# Patient Record
Sex: FEMALE | Race: WHITE | NOT HISPANIC OR LATINO | URBAN - METROPOLITAN AREA
[De-identification: names, ages, dates, MRNs, and addresses within clinical notes are randomized per-mention and may not be internally consistent; named-entity substitution may affect disease eponyms.]

---

## 2017-02-13 ENCOUNTER — OUTPATIENT (OUTPATIENT)
Dept: OUTPATIENT SERVICES | Facility: HOSPITAL | Age: 77
LOS: 1 days | End: 2017-02-13
Payer: MEDICARE

## 2017-02-13 VITALS
RESPIRATION RATE: 16 BRPM | HEIGHT: 66 IN | OXYGEN SATURATION: 98 % | WEIGHT: 192.9 LBS | SYSTOLIC BLOOD PRESSURE: 116 MMHG | DIASTOLIC BLOOD PRESSURE: 77 MMHG | TEMPERATURE: 98 F | HEART RATE: 68 BPM

## 2017-02-13 DIAGNOSIS — Z78.9 OTHER SPECIFIED HEALTH STATUS: ICD-10-CM

## 2017-02-13 DIAGNOSIS — Z98.891 HISTORY OF UTERINE SCAR FROM PREVIOUS SURGERY: Chronic | ICD-10-CM

## 2017-02-13 DIAGNOSIS — Z98.1 ARTHRODESIS STATUS: Chronic | ICD-10-CM

## 2017-02-13 DIAGNOSIS — K21.9 GASTRO-ESOPHAGEAL REFLUX DISEASE WITHOUT ESOPHAGITIS: ICD-10-CM

## 2017-02-13 DIAGNOSIS — G47.33 OBSTRUCTIVE SLEEP APNEA (ADULT) (PEDIATRIC): ICD-10-CM

## 2017-02-13 DIAGNOSIS — R13.10 DYSPHAGIA, UNSPECIFIED: ICD-10-CM

## 2017-02-13 DIAGNOSIS — Z96.659 PRESENCE OF UNSPECIFIED ARTIFICIAL KNEE JOINT: Chronic | ICD-10-CM

## 2017-02-13 DIAGNOSIS — Z96.641 PRESENCE OF RIGHT ARTIFICIAL HIP JOINT: Chronic | ICD-10-CM

## 2017-02-13 DIAGNOSIS — E11.9 TYPE 2 DIABETES MELLITUS WITHOUT COMPLICATIONS: ICD-10-CM

## 2017-02-13 LAB
HBA1C BLD-MCNC: 6.5 % — HIGH (ref 4–5.6)
HCT VFR BLD CALC: 36.1 % — SIGNIFICANT CHANGE UP (ref 34.5–45)
HGB BLD-MCNC: 11.8 G/DL — SIGNIFICANT CHANGE UP (ref 11.5–15.5)
MCHC RBC-ENTMCNC: 27.4 PG — SIGNIFICANT CHANGE UP (ref 27–34)
MCHC RBC-ENTMCNC: 32.7 GM/DL — SIGNIFICANT CHANGE UP (ref 32–36)
MCV RBC AUTO: 83.8 FL — SIGNIFICANT CHANGE UP (ref 80–100)
PLATELET # BLD AUTO: 258 K/UL — SIGNIFICANT CHANGE UP (ref 150–400)
RBC # BLD: 4.31 M/UL — SIGNIFICANT CHANGE UP (ref 3.8–5.2)
RBC # FLD: 15 % — HIGH (ref 10.3–14.5)
WBC # BLD: 7 K/UL — SIGNIFICANT CHANGE UP (ref 3.8–10.5)
WBC # FLD AUTO: 7 K/UL — SIGNIFICANT CHANGE UP (ref 3.8–10.5)

## 2017-02-13 PROCEDURE — G0463: CPT

## 2017-02-13 PROCEDURE — 83036 HEMOGLOBIN GLYCOSYLATED A1C: CPT

## 2017-02-13 PROCEDURE — 85027 COMPLETE CBC AUTOMATED: CPT

## 2017-02-13 PROCEDURE — 80048 BASIC METABOLIC PNL TOTAL CA: CPT

## 2017-02-13 PROCEDURE — 36415 COLL VENOUS BLD VENIPUNCTURE: CPT

## 2017-02-13 RX ORDER — ASPIRIN/CALCIUM CARB/MAGNESIUM 324 MG
1 TABLET ORAL
Qty: 0 | Refills: 0 | COMMUNITY

## 2017-02-13 NOTE — H&P PST ADULT - CHRONIC PAIN COMMENT, PROFILE
no pain medications , has pending appointment with pain management MD, previously reports no relieve with pain meds

## 2017-02-13 NOTE — H&P PST ADULT - PROBLEM SELECTOR PLAN 3
As pre pt,  Dr. Peacock aware that she is on Plavix and ASA and there is no need to hold both medications. I left message with Patsy to confirm.

## 2017-02-13 NOTE — H&P PST ADULT - HISTORY OF PRESENT ILLNESS
76 yr old female with CAD x 2 stents, HTN, HLD, DM2, VITO on CPAP,  GERD, Hypothyroidism, chronic pain, RSD, osteoarthritis, reports worsening GERD, Acid reflex, dysphagia, reports completing swallow study recently,  presents to PST for scheduled endoscopy botox on 2/16/17. Ambulating with walker.

## 2017-02-13 NOTE — H&P PST ADULT - PMH
Dysphagia Anxiety    Chronic constipation    Coronary artery disease    Dysphagia    Former smoker    GERD (gastroesophageal reflux disease)    Glaucoma    Hard of hearing    History of gastric ulcer    History of hepatitis A    Hyperlipidemia    Hypertension    Hypothyroidism    Migraine headache    VITO on CPAP    Osteoarthritis    Presence of stent in artery  x2 2005 on Plavix, Asa  RSD (reflex sympathetic dystrophy)  Chronic pain, both knees L>R, back, not taking pain medications  Type 2 diabetes mellitus

## 2017-02-13 NOTE — H&P PST ADULT - PSH
S/P   x2  S/P hip replacement, right  2016  S/P knee replacement  x 2  and   S/P laminectomy with spinal fusion  2011

## 2017-02-13 NOTE — H&P PST ADULT - EXTREMITIES COMMENTS
low extremities, slightly discolored, both knees tender to touch, pt reports no changes, chronic pain and discoloration

## 2017-02-13 NOTE — H&P PST ADULT - NEGATIVE GASTROINTESTINAL SYMPTOMS
no abdominal pain/no change in bowel habits no change in bowel habits/no vomiting/no nausea/no abdominal pain

## 2017-02-13 NOTE — H&P PST ADULT - RS GEN PE MLT RESP DETAILS PC
clear to auscultation bilaterally/breath sounds equal/airway patent/respirations non-labored/good air movement

## 2017-02-13 NOTE — H&P PST ADULT - NEGATIVE CARDIOVASCULAR SYMPTOMS
no chest pain/no peripheral edema/no palpitations/no paroxysmal nocturnal dyspnea/no claudication/no orthopnea/no dyspnea on exertion

## 2017-02-14 LAB
ANION GAP SERPL CALC-SCNC: 18 MMOL/L — HIGH (ref 5–17)
BUN SERPL-MCNC: 18 MG/DL — SIGNIFICANT CHANGE UP (ref 7–23)
CALCIUM SERPL-MCNC: 10 MG/DL — SIGNIFICANT CHANGE UP (ref 8.4–10.5)
CHLORIDE SERPL-SCNC: 106 MMOL/L — SIGNIFICANT CHANGE UP (ref 96–108)
CO2 SERPL-SCNC: 16 MMOL/L — LOW (ref 22–31)
CREAT SERPL-MCNC: 0.7 MG/DL — SIGNIFICANT CHANGE UP (ref 0.5–1.3)
GLUCOSE SERPL-MCNC: 143 MG/DL — HIGH (ref 70–99)
POTASSIUM SERPL-MCNC: 4.7 MMOL/L — SIGNIFICANT CHANGE UP (ref 3.5–5.3)
POTASSIUM SERPL-SCNC: 4.7 MMOL/L — SIGNIFICANT CHANGE UP (ref 3.5–5.3)
SODIUM SERPL-SCNC: 140 MMOL/L — SIGNIFICANT CHANGE UP (ref 135–145)

## 2017-02-16 ENCOUNTER — OUTPATIENT (OUTPATIENT)
Dept: OUTPATIENT SERVICES | Facility: HOSPITAL | Age: 77
LOS: 1 days | Discharge: HOME | End: 2017-02-16
Payer: MEDICARE

## 2017-02-16 DIAGNOSIS — Z98.1 ARTHRODESIS STATUS: Chronic | ICD-10-CM

## 2017-02-16 DIAGNOSIS — Z96.641 PRESENCE OF RIGHT ARTIFICIAL HIP JOINT: Chronic | ICD-10-CM

## 2017-02-16 DIAGNOSIS — K21.9 GASTRO-ESOPHAGEAL REFLUX DISEASE WITHOUT ESOPHAGITIS: ICD-10-CM

## 2017-02-16 DIAGNOSIS — Z96.659 PRESENCE OF UNSPECIFIED ARTIFICIAL KNEE JOINT: Chronic | ICD-10-CM

## 2017-02-16 DIAGNOSIS — Z98.891 HISTORY OF UTERINE SCAR FROM PREVIOUS SURGERY: Chronic | ICD-10-CM

## 2017-02-16 PROCEDURE — 43239 EGD BIOPSY SINGLE/MULTIPLE: CPT

## 2017-02-23 DIAGNOSIS — I25.10 ATHEROSCLEROTIC HEART DISEASE OF NATIVE CORONARY ARTERY WITHOUT ANGINA PECTORIS: ICD-10-CM

## 2017-02-23 DIAGNOSIS — Z79.02 LONG TERM (CURRENT) USE OF ANTITHROMBOTICS/ANTIPLATELETS: ICD-10-CM

## 2017-02-23 DIAGNOSIS — Z96.641 PRESENCE OF RIGHT ARTIFICIAL HIP JOINT: ICD-10-CM

## 2017-02-23 DIAGNOSIS — Z98.61 CORONARY ANGIOPLASTY STATUS: ICD-10-CM

## 2017-02-23 DIAGNOSIS — H40.9 UNSPECIFIED GLAUCOMA: ICD-10-CM

## 2017-02-23 DIAGNOSIS — I10 ESSENTIAL (PRIMARY) HYPERTENSION: ICD-10-CM

## 2017-02-23 DIAGNOSIS — Z79.82 LONG TERM (CURRENT) USE OF ASPIRIN: ICD-10-CM

## 2017-02-23 DIAGNOSIS — Z96.653 PRESENCE OF ARTIFICIAL KNEE JOINT, BILATERAL: ICD-10-CM

## 2017-02-23 DIAGNOSIS — K29.70 GASTRITIS, UNSPECIFIED, WITHOUT BLEEDING: ICD-10-CM

## 2017-02-23 DIAGNOSIS — E11.9 TYPE 2 DIABETES MELLITUS WITHOUT COMPLICATIONS: ICD-10-CM

## 2017-02-23 DIAGNOSIS — Z88.0 ALLERGY STATUS TO PENICILLIN: ICD-10-CM

## 2017-02-23 DIAGNOSIS — K21.9 GASTRO-ESOPHAGEAL REFLUX DISEASE WITHOUT ESOPHAGITIS: ICD-10-CM

## 2017-05-26 ENCOUNTER — RESULT REVIEW (OUTPATIENT)
Age: 77
End: 2017-05-26

## 2017-06-08 ENCOUNTER — APPOINTMENT (OUTPATIENT)
Dept: CT IMAGING | Facility: CLINIC | Age: 77
End: 2017-06-08

## 2017-06-08 ENCOUNTER — OUTPATIENT (OUTPATIENT)
Dept: OUTPATIENT SERVICES | Facility: HOSPITAL | Age: 77
LOS: 1 days | End: 2017-06-08
Payer: MEDICARE

## 2017-06-08 DIAGNOSIS — Z98.891 HISTORY OF UTERINE SCAR FROM PREVIOUS SURGERY: Chronic | ICD-10-CM

## 2017-06-08 DIAGNOSIS — Z96.659 PRESENCE OF UNSPECIFIED ARTIFICIAL KNEE JOINT: Chronic | ICD-10-CM

## 2017-06-08 DIAGNOSIS — Z96.641 PRESENCE OF RIGHT ARTIFICIAL HIP JOINT: Chronic | ICD-10-CM

## 2017-06-08 DIAGNOSIS — Z98.1 ARTHRODESIS STATUS: Chronic | ICD-10-CM

## 2017-06-08 DIAGNOSIS — Z00.8 ENCOUNTER FOR OTHER GENERAL EXAMINATION: ICD-10-CM

## 2017-06-08 PROCEDURE — 71250 CT THORAX DX C-: CPT

## 2017-07-12 ENCOUNTER — OUTPATIENT (OUTPATIENT)
Dept: OUTPATIENT SERVICES | Facility: HOSPITAL | Age: 77
LOS: 1 days | End: 2017-07-12
Payer: MEDICARE

## 2017-07-12 ENCOUNTER — APPOINTMENT (OUTPATIENT)
Dept: MRI IMAGING | Facility: CLINIC | Age: 77
End: 2017-07-12

## 2017-07-12 DIAGNOSIS — Z98.891 HISTORY OF UTERINE SCAR FROM PREVIOUS SURGERY: Chronic | ICD-10-CM

## 2017-07-12 DIAGNOSIS — Z98.1 ARTHRODESIS STATUS: Chronic | ICD-10-CM

## 2017-07-12 DIAGNOSIS — Z96.659 PRESENCE OF UNSPECIFIED ARTIFICIAL KNEE JOINT: Chronic | ICD-10-CM

## 2017-07-12 DIAGNOSIS — Z00.8 ENCOUNTER FOR OTHER GENERAL EXAMINATION: ICD-10-CM

## 2017-07-12 DIAGNOSIS — Z96.641 PRESENCE OF RIGHT ARTIFICIAL HIP JOINT: Chronic | ICD-10-CM

## 2017-07-13 PROCEDURE — A9585: CPT

## 2017-07-13 PROCEDURE — 72158 MRI LUMBAR SPINE W/O & W/DYE: CPT

## 2017-07-13 PROCEDURE — 82565 ASSAY OF CREATININE: CPT

## 2017-10-13 ENCOUNTER — OUTPATIENT (OUTPATIENT)
Dept: OUTPATIENT SERVICES | Facility: HOSPITAL | Age: 77
LOS: 1 days | End: 2017-10-13
Payer: MEDICARE

## 2017-10-13 ENCOUNTER — APPOINTMENT (OUTPATIENT)
Dept: NUCLEAR MEDICINE | Facility: CLINIC | Age: 77
End: 2017-10-13

## 2017-10-13 DIAGNOSIS — Z96.659 PRESENCE OF UNSPECIFIED ARTIFICIAL KNEE JOINT: Chronic | ICD-10-CM

## 2017-10-13 DIAGNOSIS — Z00.8 ENCOUNTER FOR OTHER GENERAL EXAMINATION: ICD-10-CM

## 2017-10-13 DIAGNOSIS — Z98.891 HISTORY OF UTERINE SCAR FROM PREVIOUS SURGERY: Chronic | ICD-10-CM

## 2017-10-13 DIAGNOSIS — Z96.641 PRESENCE OF RIGHT ARTIFICIAL HIP JOINT: Chronic | ICD-10-CM

## 2017-10-13 DIAGNOSIS — Z98.1 ARTHRODESIS STATUS: Chronic | ICD-10-CM

## 2017-10-13 PROCEDURE — 78815 PET IMAGE W/CT SKULL-THIGH: CPT | Mod: 26,PI

## 2017-10-13 PROCEDURE — A9552: CPT

## 2017-10-13 PROCEDURE — 78815 PET IMAGE W/CT SKULL-THIGH: CPT

## 2018-04-26 ENCOUNTER — APPOINTMENT (OUTPATIENT)
Dept: CT IMAGING | Facility: CLINIC | Age: 78
End: 2018-04-26
Payer: MEDICARE

## 2018-04-26 ENCOUNTER — OUTPATIENT (OUTPATIENT)
Dept: OUTPATIENT SERVICES | Facility: HOSPITAL | Age: 78
LOS: 1 days | End: 2018-04-26
Payer: MEDICARE

## 2018-04-26 DIAGNOSIS — Z96.641 PRESENCE OF RIGHT ARTIFICIAL HIP JOINT: Chronic | ICD-10-CM

## 2018-04-26 DIAGNOSIS — Z98.891 HISTORY OF UTERINE SCAR FROM PREVIOUS SURGERY: Chronic | ICD-10-CM

## 2018-04-26 DIAGNOSIS — Z96.659 PRESENCE OF UNSPECIFIED ARTIFICIAL KNEE JOINT: Chronic | ICD-10-CM

## 2018-04-26 DIAGNOSIS — Z00.8 ENCOUNTER FOR OTHER GENERAL EXAMINATION: ICD-10-CM

## 2018-04-26 DIAGNOSIS — Z98.1 ARTHRODESIS STATUS: Chronic | ICD-10-CM

## 2018-04-26 PROCEDURE — 71250 CT THORAX DX C-: CPT | Mod: 26

## 2018-04-26 PROCEDURE — 71250 CT THORAX DX C-: CPT

## 2018-08-30 ENCOUNTER — APPOINTMENT (OUTPATIENT)
Dept: MRI IMAGING | Facility: CLINIC | Age: 78
End: 2018-08-30
Payer: MEDICARE

## 2018-08-30 ENCOUNTER — OUTPATIENT (OUTPATIENT)
Dept: OUTPATIENT SERVICES | Facility: HOSPITAL | Age: 78
LOS: 1 days | End: 2018-08-30
Payer: MEDICARE

## 2018-08-30 DIAGNOSIS — Z00.8 ENCOUNTER FOR OTHER GENERAL EXAMINATION: ICD-10-CM

## 2018-08-30 DIAGNOSIS — Z98.1 ARTHRODESIS STATUS: Chronic | ICD-10-CM

## 2018-08-30 DIAGNOSIS — Z96.641 PRESENCE OF RIGHT ARTIFICIAL HIP JOINT: Chronic | ICD-10-CM

## 2018-08-30 DIAGNOSIS — Z96.659 PRESENCE OF UNSPECIFIED ARTIFICIAL KNEE JOINT: Chronic | ICD-10-CM

## 2018-08-30 DIAGNOSIS — Z98.891 HISTORY OF UTERINE SCAR FROM PREVIOUS SURGERY: Chronic | ICD-10-CM

## 2018-08-30 PROBLEM — K59.09 OTHER CONSTIPATION: Chronic | Status: ACTIVE | Noted: 2017-02-13

## 2018-08-30 PROBLEM — I10 ESSENTIAL (PRIMARY) HYPERTENSION: Chronic | Status: ACTIVE | Noted: 2017-02-13

## 2018-08-30 PROBLEM — R13.10 DYSPHAGIA, UNSPECIFIED: Chronic | Status: ACTIVE | Noted: 2017-02-13

## 2018-08-30 PROBLEM — G43.909 MIGRAINE, UNSPECIFIED, NOT INTRACTABLE, WITHOUT STATUS MIGRAINOSUS: Chronic | Status: ACTIVE | Noted: 2017-02-13

## 2018-08-30 PROBLEM — E11.9 TYPE 2 DIABETES MELLITUS WITHOUT COMPLICATIONS: Chronic | Status: ACTIVE | Noted: 2017-02-13

## 2018-08-30 PROBLEM — G90.50 COMPLEX REGIONAL PAIN SYNDROME I, UNSPECIFIED: Chronic | Status: ACTIVE | Noted: 2017-02-13

## 2018-08-30 PROBLEM — I25.10 ATHEROSCLEROTIC HEART DISEASE OF NATIVE CORONARY ARTERY WITHOUT ANGINA PECTORIS: Chronic | Status: ACTIVE | Noted: 2017-02-13

## 2018-08-30 PROBLEM — E78.5 HYPERLIPIDEMIA, UNSPECIFIED: Chronic | Status: ACTIVE | Noted: 2017-02-13

## 2018-08-30 PROBLEM — F41.9 ANXIETY DISORDER, UNSPECIFIED: Chronic | Status: ACTIVE | Noted: 2017-02-13

## 2018-08-30 PROBLEM — K21.9 GASTRO-ESOPHAGEAL REFLUX DISEASE WITHOUT ESOPHAGITIS: Chronic | Status: ACTIVE | Noted: 2017-02-13

## 2018-08-30 PROBLEM — G47.33 OBSTRUCTIVE SLEEP APNEA (ADULT) (PEDIATRIC): Chronic | Status: ACTIVE | Noted: 2017-02-13

## 2018-08-30 PROBLEM — Z87.891 PERSONAL HISTORY OF NICOTINE DEPENDENCE: Chronic | Status: ACTIVE | Noted: 2017-02-13

## 2018-08-30 PROBLEM — H40.9 UNSPECIFIED GLAUCOMA: Chronic | Status: ACTIVE | Noted: 2017-02-13

## 2018-08-30 PROBLEM — M19.90 UNSPECIFIED OSTEOARTHRITIS, UNSPECIFIED SITE: Chronic | Status: ACTIVE | Noted: 2017-02-13

## 2018-08-30 PROBLEM — H91.90 UNSPECIFIED HEARING LOSS, UNSPECIFIED EAR: Chronic | Status: ACTIVE | Noted: 2017-02-13

## 2018-08-30 PROCEDURE — 73718 MRI LOWER EXTREMITY W/O DYE: CPT | Mod: 26,RT

## 2018-08-30 PROCEDURE — 73718 MRI LOWER EXTREMITY W/O DYE: CPT

## 2018-10-09 ENCOUNTER — APPOINTMENT (OUTPATIENT)
Dept: ORTHOPEDIC SURGERY | Facility: CLINIC | Age: 78
End: 2018-10-09
Payer: MEDICARE

## 2018-10-09 VITALS
WEIGHT: 190 LBS | SYSTOLIC BLOOD PRESSURE: 132 MMHG | BODY MASS INDEX: 30.53 KG/M2 | HEART RATE: 80 BPM | TEMPERATURE: 98 F | HEIGHT: 66 IN | DIASTOLIC BLOOD PRESSURE: 73 MMHG

## 2018-10-09 PROCEDURE — 73610 X-RAY EXAM OF ANKLE: CPT | Mod: RT

## 2018-10-09 PROCEDURE — 99204 OFFICE O/P NEW MOD 45 MIN: CPT

## 2018-11-19 ENCOUNTER — OUTPATIENT (OUTPATIENT)
Dept: OUTPATIENT SERVICES | Facility: HOSPITAL | Age: 78
LOS: 1 days | End: 2018-11-19
Payer: MEDICARE

## 2018-11-19 ENCOUNTER — APPOINTMENT (OUTPATIENT)
Dept: ULTRASOUND IMAGING | Facility: CLINIC | Age: 78
End: 2018-11-19
Payer: MEDICARE

## 2018-11-19 DIAGNOSIS — Z98.891 HISTORY OF UTERINE SCAR FROM PREVIOUS SURGERY: Chronic | ICD-10-CM

## 2018-11-19 DIAGNOSIS — Z00.8 ENCOUNTER FOR OTHER GENERAL EXAMINATION: ICD-10-CM

## 2018-11-19 DIAGNOSIS — Z98.1 ARTHRODESIS STATUS: Chronic | ICD-10-CM

## 2018-11-19 DIAGNOSIS — Z96.641 PRESENCE OF RIGHT ARTIFICIAL HIP JOINT: Chronic | ICD-10-CM

## 2018-11-19 DIAGNOSIS — Z96.659 PRESENCE OF UNSPECIFIED ARTIFICIAL KNEE JOINT: Chronic | ICD-10-CM

## 2018-11-19 PROCEDURE — 93970 EXTREMITY STUDY: CPT

## 2018-11-19 PROCEDURE — 93970 EXTREMITY STUDY: CPT | Mod: 26

## 2018-11-26 ENCOUNTER — APPOINTMENT (OUTPATIENT)
Dept: ORTHOPEDIC SURGERY | Facility: CLINIC | Age: 78
End: 2018-11-26
Payer: MEDICARE

## 2018-11-26 DIAGNOSIS — M79.605 PAIN IN LEFT LEG: ICD-10-CM

## 2018-11-26 DIAGNOSIS — Z96.651 PRESENCE OF RIGHT ARTIFICIAL KNEE JOINT: ICD-10-CM

## 2018-11-26 DIAGNOSIS — Z96.652 PRESENCE OF LEFT ARTIFICIAL KNEE JOINT: ICD-10-CM

## 2018-11-26 DIAGNOSIS — M19.079 PRIMARY OSTEOARTHRITIS, UNSPECIFIED ANKLE AND FOOT: ICD-10-CM

## 2018-11-26 PROCEDURE — 99214 OFFICE O/P EST MOD 30 MIN: CPT

## 2018-11-26 PROCEDURE — 73610 X-RAY EXAM OF ANKLE: CPT | Mod: LT

## 2018-11-26 PROCEDURE — 73630 X-RAY EXAM OF FOOT: CPT | Mod: LT

## 2018-11-30 ENCOUNTER — APPOINTMENT (OUTPATIENT)
Dept: PHYSICAL MEDICINE AND REHAB | Facility: CLINIC | Age: 78
End: 2018-11-30
Payer: MEDICARE

## 2018-11-30 VITALS
DIASTOLIC BLOOD PRESSURE: 76 MMHG | TEMPERATURE: 98 F | HEART RATE: 74 BPM | SYSTOLIC BLOOD PRESSURE: 139 MMHG | OXYGEN SATURATION: 98 %

## 2018-11-30 DIAGNOSIS — R60.0 LOCALIZED EDEMA: ICD-10-CM

## 2018-11-30 DIAGNOSIS — G90.523 COMPLEX REGIONAL PAIN SYNDROME I OF LOWER LIMB, BILATERAL: ICD-10-CM

## 2018-11-30 PROCEDURE — 99203 OFFICE O/P NEW LOW 30 MIN: CPT

## 2018-12-04 ENCOUNTER — APPOINTMENT (OUTPATIENT)
Dept: CT IMAGING | Facility: CLINIC | Age: 78
End: 2018-12-04

## 2018-12-05 ENCOUNTER — APPOINTMENT (OUTPATIENT)
Dept: PULMONOLOGY | Facility: CLINIC | Age: 78
End: 2018-12-05
Payer: MEDICARE

## 2018-12-05 VITALS
RESPIRATION RATE: 12 BRPM | OXYGEN SATURATION: 98 % | HEART RATE: 71 BPM | SYSTOLIC BLOOD PRESSURE: 155 MMHG | TEMPERATURE: 98.2 F | DIASTOLIC BLOOD PRESSURE: 81 MMHG

## 2018-12-05 DIAGNOSIS — E03.9 HYPOTHYROIDISM, UNSPECIFIED: ICD-10-CM

## 2018-12-05 DIAGNOSIS — R06.09 OTHER FORMS OF DYSPNEA: ICD-10-CM

## 2018-12-05 DIAGNOSIS — Z99.89 OBSTRUCTIVE SLEEP APNEA (ADULT) (PEDIATRIC): ICD-10-CM

## 2018-12-05 DIAGNOSIS — G47.33 OBSTRUCTIVE SLEEP APNEA (ADULT) (PEDIATRIC): ICD-10-CM

## 2018-12-05 PROCEDURE — 71046 X-RAY EXAM CHEST 2 VIEWS: CPT

## 2018-12-05 PROCEDURE — 99214 OFFICE O/P EST MOD 30 MIN: CPT | Mod: 25

## 2018-12-05 PROCEDURE — 94060 EVALUATION OF WHEEZING: CPT

## 2018-12-05 PROCEDURE — 94729 DIFFUSING CAPACITY: CPT

## 2018-12-05 PROCEDURE — 94727 GAS DIL/WSHOT DETER LNG VOL: CPT

## 2018-12-05 RX ORDER — FLUTICASONE FUROATE AND VILANTEROL TRIFENATATE 200; 25 UG/1; UG/1
200-25 POWDER RESPIRATORY (INHALATION) DAILY
Qty: 1 | Refills: 5 | Status: ACTIVE | COMMUNITY
Start: 2018-12-05 | End: 1900-01-01

## 2018-12-12 ENCOUNTER — APPOINTMENT (OUTPATIENT)
Dept: NUCLEAR MEDICINE | Facility: IMAGING CENTER | Age: 78
End: 2018-12-12

## 2018-12-20 ENCOUNTER — APPOINTMENT (OUTPATIENT)
Dept: NUCLEAR MEDICINE | Facility: IMAGING CENTER | Age: 78
End: 2018-12-20

## 2019-01-03 ENCOUNTER — APPOINTMENT (OUTPATIENT)
Dept: VASCULAR SURGERY | Facility: CLINIC | Age: 79
End: 2019-01-03

## 2019-01-04 ENCOUNTER — OTHER (OUTPATIENT)
Age: 79
End: 2019-01-04

## 2019-01-04 DIAGNOSIS — I27.20 PULMONARY HYPERTENSION, UNSPECIFIED: ICD-10-CM

## 2019-02-10 ENCOUNTER — OUTPATIENT (OUTPATIENT)
Dept: OUTPATIENT SERVICES | Facility: HOSPITAL | Age: 79
LOS: 1 days | End: 2019-02-10
Payer: MEDICARE

## 2019-02-10 ENCOUNTER — APPOINTMENT (OUTPATIENT)
Age: 79
End: 2019-02-10
Payer: MEDICARE

## 2019-02-10 DIAGNOSIS — Z00.8 ENCOUNTER FOR OTHER GENERAL EXAMINATION: ICD-10-CM

## 2019-02-10 DIAGNOSIS — Z96.659 PRESENCE OF UNSPECIFIED ARTIFICIAL KNEE JOINT: Chronic | ICD-10-CM

## 2019-02-10 DIAGNOSIS — Z98.891 HISTORY OF UTERINE SCAR FROM PREVIOUS SURGERY: Chronic | ICD-10-CM

## 2019-02-10 DIAGNOSIS — Z98.1 ARTHRODESIS STATUS: Chronic | ICD-10-CM

## 2019-02-10 DIAGNOSIS — Z96.641 PRESENCE OF RIGHT ARTIFICIAL HIP JOINT: Chronic | ICD-10-CM

## 2019-02-10 PROCEDURE — 72148 MRI LUMBAR SPINE W/O DYE: CPT | Mod: 26

## 2019-02-10 PROCEDURE — 72148 MRI LUMBAR SPINE W/O DYE: CPT

## 2019-02-20 ENCOUNTER — RX RENEWAL (OUTPATIENT)
Age: 79
End: 2019-02-20

## 2019-02-20 DIAGNOSIS — R91.1 SOLITARY PULMONARY NODULE: ICD-10-CM

## 2019-03-06 ENCOUNTER — APPOINTMENT (OUTPATIENT)
Dept: PULMONOLOGY | Facility: CLINIC | Age: 79
End: 2019-03-06

## 2019-06-25 ENCOUNTER — OTHER (OUTPATIENT)
Age: 79
End: 2019-06-25

## 2019-08-14 ENCOUNTER — TRANSCRIPTION ENCOUNTER (OUTPATIENT)
Age: 79
End: 2019-08-14

## 2019-08-14 ENCOUNTER — APPOINTMENT (OUTPATIENT)
Dept: CT IMAGING | Facility: CLINIC | Age: 79
End: 2019-08-14
Payer: MEDICARE

## 2019-08-14 ENCOUNTER — OUTPATIENT (OUTPATIENT)
Dept: OUTPATIENT SERVICES | Facility: HOSPITAL | Age: 79
LOS: 1 days | End: 2019-08-14
Payer: MEDICARE

## 2019-08-14 DIAGNOSIS — Z96.659 PRESENCE OF UNSPECIFIED ARTIFICIAL KNEE JOINT: Chronic | ICD-10-CM

## 2019-08-14 DIAGNOSIS — Z98.1 ARTHRODESIS STATUS: Chronic | ICD-10-CM

## 2019-08-14 DIAGNOSIS — Z98.891 HISTORY OF UTERINE SCAR FROM PREVIOUS SURGERY: Chronic | ICD-10-CM

## 2019-08-14 DIAGNOSIS — Z00.8 ENCOUNTER FOR OTHER GENERAL EXAMINATION: ICD-10-CM

## 2019-08-14 DIAGNOSIS — Z96.641 PRESENCE OF RIGHT ARTIFICIAL HIP JOINT: Chronic | ICD-10-CM

## 2019-08-14 PROCEDURE — 71250 CT THORAX DX C-: CPT | Mod: 26

## 2019-08-14 PROCEDURE — 71250 CT THORAX DX C-: CPT

## 2019-08-26 ENCOUNTER — OUTPATIENT (OUTPATIENT)
Dept: OUTPATIENT SERVICES | Facility: HOSPITAL | Age: 79
LOS: 1 days | End: 2019-08-26
Payer: MEDICARE

## 2019-08-26 VITALS
DIASTOLIC BLOOD PRESSURE: 76 MMHG | WEIGHT: 182.1 LBS | HEART RATE: 62 BPM | OXYGEN SATURATION: 99 % | HEIGHT: 64 IN | SYSTOLIC BLOOD PRESSURE: 136 MMHG | RESPIRATION RATE: 16 BRPM | TEMPERATURE: 98 F

## 2019-08-26 DIAGNOSIS — I10 ESSENTIAL (PRIMARY) HYPERTENSION: ICD-10-CM

## 2019-08-26 DIAGNOSIS — E11.9 TYPE 2 DIABETES MELLITUS WITHOUT COMPLICATIONS: ICD-10-CM

## 2019-08-26 DIAGNOSIS — Z95.5 PRESENCE OF CORONARY ANGIOPLASTY IMPLANT AND GRAFT: ICD-10-CM

## 2019-08-26 DIAGNOSIS — Z98.1 ARTHRODESIS STATUS: Chronic | ICD-10-CM

## 2019-08-26 DIAGNOSIS — Z98.891 HISTORY OF UTERINE SCAR FROM PREVIOUS SURGERY: Chronic | ICD-10-CM

## 2019-08-26 DIAGNOSIS — Z96.641 PRESENCE OF RIGHT ARTIFICIAL HIP JOINT: Chronic | ICD-10-CM

## 2019-08-26 DIAGNOSIS — D64.9 ANEMIA, UNSPECIFIED: ICD-10-CM

## 2019-08-26 DIAGNOSIS — G47.33 OBSTRUCTIVE SLEEP APNEA (ADULT) (PEDIATRIC): ICD-10-CM

## 2019-08-26 DIAGNOSIS — Z01.818 ENCOUNTER FOR OTHER PREPROCEDURAL EXAMINATION: ICD-10-CM

## 2019-08-26 DIAGNOSIS — Z98.49 CATARACT EXTRACTION STATUS, UNSPECIFIED EYE: Chronic | ICD-10-CM

## 2019-08-26 DIAGNOSIS — Z96.659 PRESENCE OF UNSPECIFIED ARTIFICIAL KNEE JOINT: Chronic | ICD-10-CM

## 2019-08-26 PROCEDURE — G0463: CPT

## 2019-08-26 RX ORDER — LIFITEGRAST 50 MG/ML
1 SOLUTION/ DROPS OPHTHALMIC
Qty: 0 | Refills: 0 | DISCHARGE

## 2019-08-26 RX ORDER — OMEGA-3 ACID ETHYL ESTERS 1 G
1 CAPSULE ORAL
Qty: 0 | Refills: 0 | DISCHARGE

## 2019-08-26 RX ORDER — OLMESARTAN MEDOXOMIL 5 MG/1
1 TABLET, FILM COATED ORAL
Qty: 0 | Refills: 0 | DISCHARGE

## 2019-08-26 RX ORDER — LANSOPRAZOLE 15 MG/1
1 CAPSULE, DELAYED RELEASE ORAL
Qty: 0 | Refills: 0 | DISCHARGE

## 2019-08-26 RX ORDER — EZETIMIBE AND SIMVASTATIN 10; 80 MG/1; MG/1
1 TABLET, FILM COATED ORAL
Qty: 0 | Refills: 0 | DISCHARGE

## 2019-08-26 NOTE — H&P PST ADULT - PAIN DESCRIPTION (FREQUENCY/QUALITY), PROFILE
Received patient a/ox4. Denies pain. No other new complaints. He was maintained NPO after midght. He was update on plan of care and virbalizese understanding.     Diabetes/Glucose Control    • Glucose maintained within prescribed range Progressing        Pa constant

## 2019-08-26 NOTE — H&P PST ADULT - NSICDXPASTSURGICALHX_GEN_ALL_CORE_FT
PAST SURGICAL HISTORY:  H/O cataract extraction     S/P  x2    S/P hip replacement, right 2016    S/P knee replacement x 2  and     S/P laminectomy with spinal fusion

## 2019-08-26 NOTE — H&P PST ADULT - NEGATIVE CARDIOVASCULAR SYMPTOMS
no peripheral edema/no orthopnea/no dyspnea on exertion/no paroxysmal nocturnal dyspnea/no chest pain/no claudication/no palpitations no claudication/no paroxysmal nocturnal dyspnea/no orthopnea/no chest pain/no peripheral edema/no palpitations

## 2019-08-26 NOTE — H&P PST ADULT - NSICDXPASTMEDICALHX_GEN_ALL_CORE_FT
PAST MEDICAL HISTORY:  Anxiety     Chronic constipation     Coronary artery disease     Dysphagia     Former smoker     GERD (gastroesophageal reflux disease)     Glaucoma     Hard of hearing     History of gastric ulcer duodenal ulcer 2008    History of hepatitis A 1968    Hyperlipidemia     Hypertension     Hypothyroidism due to Hashimoto's thyroiditis     Migraine headache     VITO on CPAP     Osteoarthritis     Presence of stent in artery x2 2005 , 10/2018 x 1, 04/2019 x 1 on Plavix, Asa    RSD (reflex sympathetic dystrophy) Chronic pain, both knees L>R, back, not taking pain medications    Type 2 diabetes mellitus

## 2019-08-26 NOTE — H&P PST ADULT - HISTORY OF PRESENT ILLNESS
78 yo F HTN, CAD with PCI, GERD, T2DM, c/o increased fatigue x  4 months. Pt had cardiology/ GI consult revealed low H&H. Pt scheduled for EGD/colonoscopy on 09/06/2019. Pt denies any abdominal pain /rectal bleeding 80 yo F  with HTN, CAD with PCI, GERD, T2DM, c/o increased fatigue x  4 months. Pt had cardiology/ GI consult revealed low H&H. Pt scheduled for EGD/colonoscopy on 09/06/2019. Pt denies any abdominal pain /rectal bleeding

## 2019-08-26 NOTE — H&P PST ADULT - NSICDXPROBLEM_GEN_ALL_CORE_FT
PROBLEM DIAGNOSES  Problem: Anemia, unspecified  Assessment and Plan: EGD/Colonoscopy  Labs, EKG done with cardiologist    Problem: VITO on CPAP  Assessment and Plan: Follow VITO protocol    Problem: DM type 2, goal HbA1c 7%-8%  Assessment and Plan: FS BS DOS    Problem: Stented coronary artery  Assessment and Plan: Continue with Plavix & Aspirin as per cardiologist    Problem: Benign hypertension  Assessment and Plan: continue with meds

## 2019-08-26 NOTE — H&P PST ADULT - ACTIVITY
walker, able to walk from main lobby to [facility], walks @ slow pace, ADL, moderate house chores

## 2019-08-26 NOTE — H&P PST ADULT - OTHER CARE PROVIDERS
cardiologist Dr. Mccoy [phone] Last visit 08/14/2019, pulm Dr. Laureano Shields [phone] cardiologist Dr. Mccoy [phone] Last visit 08/14/2019, Dr. Laureano Shields [phone](pul)

## 2019-09-06 ENCOUNTER — RESULT REVIEW (OUTPATIENT)
Age: 79
End: 2019-09-06

## 2019-09-06 ENCOUNTER — OUTPATIENT (OUTPATIENT)
Dept: OUTPATIENT SERVICES | Facility: HOSPITAL | Age: 79
LOS: 1 days | End: 2019-09-06
Payer: MEDICARE

## 2019-09-06 DIAGNOSIS — D64.9 ANEMIA, UNSPECIFIED: ICD-10-CM

## 2019-09-06 DIAGNOSIS — Z98.891 HISTORY OF UTERINE SCAR FROM PREVIOUS SURGERY: Chronic | ICD-10-CM

## 2019-09-06 DIAGNOSIS — Z96.659 PRESENCE OF UNSPECIFIED ARTIFICIAL KNEE JOINT: Chronic | ICD-10-CM

## 2019-09-06 DIAGNOSIS — Z98.49 CATARACT EXTRACTION STATUS, UNSPECIFIED EYE: Chronic | ICD-10-CM

## 2019-09-06 DIAGNOSIS — Z98.1 ARTHRODESIS STATUS: Chronic | ICD-10-CM

## 2019-09-06 DIAGNOSIS — Z96.641 PRESENCE OF RIGHT ARTIFICIAL HIP JOINT: Chronic | ICD-10-CM

## 2019-09-06 LAB — GLUCOSE BLDC GLUCOMTR-MCNC: 128 MG/DL — HIGH (ref 70–99)

## 2019-09-06 PROCEDURE — 45378 DIAGNOSTIC COLONOSCOPY: CPT

## 2019-09-06 PROCEDURE — 88305 TISSUE EXAM BY PATHOLOGIST: CPT

## 2019-09-06 PROCEDURE — 88312 SPECIAL STAINS GROUP 1: CPT | Mod: 26

## 2019-09-06 PROCEDURE — 43239 EGD BIOPSY SINGLE/MULTIPLE: CPT

## 2019-09-06 PROCEDURE — 88305 TISSUE EXAM BY PATHOLOGIST: CPT | Mod: 26

## 2019-09-06 PROCEDURE — 82962 GLUCOSE BLOOD TEST: CPT

## 2019-09-06 PROCEDURE — 88312 SPECIAL STAINS GROUP 1: CPT

## 2019-09-09 LAB — SURGICAL PATHOLOGY STUDY: SIGNIFICANT CHANGE UP

## 2019-09-20 ENCOUNTER — APPOINTMENT (OUTPATIENT)
Dept: RADIOLOGY | Facility: CLINIC | Age: 79
End: 2019-09-20
Payer: MEDICARE

## 2019-09-20 ENCOUNTER — OUTPATIENT (OUTPATIENT)
Dept: OUTPATIENT SERVICES | Facility: HOSPITAL | Age: 79
LOS: 1 days | End: 2019-09-20
Payer: MEDICARE

## 2019-09-20 DIAGNOSIS — Z98.1 ARTHRODESIS STATUS: Chronic | ICD-10-CM

## 2019-09-20 DIAGNOSIS — M85.88 OTHER SPECIFIED DISORDERS OF BONE DENSITY AND STRUCTURE, OTHER SITE: ICD-10-CM

## 2019-09-20 DIAGNOSIS — Z98.891 HISTORY OF UTERINE SCAR FROM PREVIOUS SURGERY: Chronic | ICD-10-CM

## 2019-09-20 DIAGNOSIS — Z98.49 CATARACT EXTRACTION STATUS, UNSPECIFIED EYE: Chronic | ICD-10-CM

## 2019-09-20 DIAGNOSIS — Z96.659 PRESENCE OF UNSPECIFIED ARTIFICIAL KNEE JOINT: Chronic | ICD-10-CM

## 2019-09-20 DIAGNOSIS — Z96.641 PRESENCE OF RIGHT ARTIFICIAL HIP JOINT: Chronic | ICD-10-CM

## 2019-09-20 PROBLEM — Z87.19 PERSONAL HISTORY OF OTHER DISEASES OF THE DIGESTIVE SYSTEM: Chronic | Status: ACTIVE | Noted: 2017-02-13

## 2019-09-20 PROBLEM — Z78.9 OTHER SPECIFIED HEALTH STATUS: Chronic | Status: ACTIVE | Noted: 2017-02-13

## 2019-09-20 PROBLEM — Z86.19 PERSONAL HISTORY OF OTHER INFECTIOUS AND PARASITIC DISEASES: Chronic | Status: ACTIVE | Noted: 2017-02-13

## 2019-09-20 PROBLEM — E03.8 OTHER SPECIFIED HYPOTHYROIDISM: Chronic | Status: ACTIVE | Noted: 2019-08-26

## 2019-09-20 PROCEDURE — 77080 DXA BONE DENSITY AXIAL: CPT | Mod: 26

## 2019-09-20 PROCEDURE — 77080 DXA BONE DENSITY AXIAL: CPT

## 2019-12-04 ENCOUNTER — APPOINTMENT (OUTPATIENT)
Dept: CT IMAGING | Facility: CLINIC | Age: 79
End: 2019-12-04
Payer: MEDICARE

## 2019-12-04 ENCOUNTER — OUTPATIENT (OUTPATIENT)
Dept: OUTPATIENT SERVICES | Facility: HOSPITAL | Age: 79
LOS: 1 days | End: 2019-12-04
Payer: MEDICARE

## 2019-12-04 DIAGNOSIS — R91.1 SOLITARY PULMONARY NODULE: ICD-10-CM

## 2019-12-04 DIAGNOSIS — Z98.49 CATARACT EXTRACTION STATUS, UNSPECIFIED EYE: Chronic | ICD-10-CM

## 2019-12-04 DIAGNOSIS — Z96.641 PRESENCE OF RIGHT ARTIFICIAL HIP JOINT: Chronic | ICD-10-CM

## 2019-12-04 DIAGNOSIS — Z98.1 ARTHRODESIS STATUS: Chronic | ICD-10-CM

## 2019-12-04 DIAGNOSIS — Z98.891 HISTORY OF UTERINE SCAR FROM PREVIOUS SURGERY: Chronic | ICD-10-CM

## 2019-12-04 DIAGNOSIS — Z96.659 PRESENCE OF UNSPECIFIED ARTIFICIAL KNEE JOINT: Chronic | ICD-10-CM

## 2019-12-04 PROCEDURE — 71250 CT THORAX DX C-: CPT | Mod: 26

## 2019-12-04 PROCEDURE — 71250 CT THORAX DX C-: CPT

## 2019-12-10 ENCOUNTER — APPOINTMENT (OUTPATIENT)
Dept: ULTRASOUND IMAGING | Facility: CLINIC | Age: 79
End: 2019-12-10
Payer: MEDICARE

## 2019-12-10 ENCOUNTER — OUTPATIENT (OUTPATIENT)
Dept: OUTPATIENT SERVICES | Facility: HOSPITAL | Age: 79
LOS: 1 days | End: 2019-12-10
Payer: MEDICARE

## 2019-12-10 DIAGNOSIS — Z96.641 PRESENCE OF RIGHT ARTIFICIAL HIP JOINT: Chronic | ICD-10-CM

## 2019-12-10 DIAGNOSIS — Z96.659 PRESENCE OF UNSPECIFIED ARTIFICIAL KNEE JOINT: Chronic | ICD-10-CM

## 2019-12-10 DIAGNOSIS — R10.9 UNSPECIFIED ABDOMINAL PAIN: ICD-10-CM

## 2019-12-10 DIAGNOSIS — Z98.49 CATARACT EXTRACTION STATUS, UNSPECIFIED EYE: Chronic | ICD-10-CM

## 2019-12-10 DIAGNOSIS — E66.3 OVERWEIGHT: ICD-10-CM

## 2019-12-10 DIAGNOSIS — Z98.891 HISTORY OF UTERINE SCAR FROM PREVIOUS SURGERY: Chronic | ICD-10-CM

## 2019-12-10 DIAGNOSIS — Z98.1 ARTHRODESIS STATUS: Chronic | ICD-10-CM

## 2019-12-10 PROCEDURE — 76700 US EXAM ABDOM COMPLETE: CPT

## 2019-12-10 PROCEDURE — 76700 US EXAM ABDOM COMPLETE: CPT | Mod: 26

## 2020-03-09 DIAGNOSIS — R07.89 OTHER CHEST PAIN: ICD-10-CM

## 2020-03-13 ENCOUNTER — APPOINTMENT (OUTPATIENT)
Dept: GASTROENTEROLOGY | Facility: HOSPITAL | Age: 80
End: 2020-03-13

## 2020-03-13 ENCOUNTER — OUTPATIENT (OUTPATIENT)
Dept: OUTPATIENT SERVICES | Facility: HOSPITAL | Age: 80
LOS: 1 days | End: 2020-03-13
Payer: MEDICARE

## 2020-03-13 DIAGNOSIS — Z96.641 PRESENCE OF RIGHT ARTIFICIAL HIP JOINT: Chronic | ICD-10-CM

## 2020-03-13 DIAGNOSIS — Z98.891 HISTORY OF UTERINE SCAR FROM PREVIOUS SURGERY: Chronic | ICD-10-CM

## 2020-03-13 DIAGNOSIS — Z98.1 ARTHRODESIS STATUS: Chronic | ICD-10-CM

## 2020-03-13 DIAGNOSIS — K21.9 GASTRO-ESOPHAGEAL REFLUX DISEASE WITHOUT ESOPHAGITIS: ICD-10-CM

## 2020-03-13 DIAGNOSIS — Z96.659 PRESENCE OF UNSPECIFIED ARTIFICIAL KNEE JOINT: Chronic | ICD-10-CM

## 2020-03-13 DIAGNOSIS — Z98.49 CATARACT EXTRACTION STATUS, UNSPECIFIED EYE: Chronic | ICD-10-CM

## 2020-03-13 PROCEDURE — 91010 ESOPHAGUS MOTILITY STUDY: CPT

## 2020-03-13 PROCEDURE — 91010 ESOPHAGUS MOTILITY STUDY: CPT | Mod: 26

## 2020-03-13 PROCEDURE — 91037 ESOPH IMPED FUNCTION TEST: CPT | Mod: 26

## 2020-06-08 ENCOUNTER — OUTPATIENT (OUTPATIENT)
Dept: OUTPATIENT SERVICES | Facility: HOSPITAL | Age: 80
LOS: 1 days | End: 2020-06-08
Payer: MEDICARE

## 2020-06-08 ENCOUNTER — APPOINTMENT (OUTPATIENT)
Dept: CT IMAGING | Facility: CLINIC | Age: 80
End: 2020-06-08
Payer: MEDICARE

## 2020-06-08 DIAGNOSIS — Z98.1 ARTHRODESIS STATUS: Chronic | ICD-10-CM

## 2020-06-08 DIAGNOSIS — Z98.891 HISTORY OF UTERINE SCAR FROM PREVIOUS SURGERY: Chronic | ICD-10-CM

## 2020-06-08 DIAGNOSIS — Z96.659 PRESENCE OF UNSPECIFIED ARTIFICIAL KNEE JOINT: Chronic | ICD-10-CM

## 2020-06-08 DIAGNOSIS — R91.1 SOLITARY PULMONARY NODULE: ICD-10-CM

## 2020-06-08 DIAGNOSIS — Z98.49 CATARACT EXTRACTION STATUS, UNSPECIFIED EYE: Chronic | ICD-10-CM

## 2020-06-08 DIAGNOSIS — Z96.641 PRESENCE OF RIGHT ARTIFICIAL HIP JOINT: Chronic | ICD-10-CM

## 2020-06-08 PROCEDURE — 71260 CT THORAX DX C+: CPT | Mod: 26

## 2020-06-08 PROCEDURE — 71260 CT THORAX DX C+: CPT

## 2020-06-08 PROCEDURE — 82565 ASSAY OF CREATININE: CPT

## 2020-06-25 ENCOUNTER — RESULT REVIEW (OUTPATIENT)
Age: 80
End: 2020-06-25

## 2020-06-25 NOTE — H&P PST ADULT - WEIGHT IN KG
Transitions of Care Status:  1.  Name of PCP:  2.  PCP Contacted on Admission: [ ] Y    [ ] N    3.  PCP contacted at Discharge: [ ] Y    [ ] N    [ ] N/A  4.  Post-Discharge Appointment Date and Location:  5.  Summary of Handoff given to PCP: - diet - DASH/TLC  - DVT: lovenox 87.5

## 2020-09-10 ENCOUNTER — RESULT REVIEW (OUTPATIENT)
Age: 80
End: 2020-09-10

## 2020-09-11 ENCOUNTER — OUTPATIENT (OUTPATIENT)
Dept: OUTPATIENT SERVICES | Facility: HOSPITAL | Age: 80
LOS: 1 days | End: 2020-09-11
Payer: MEDICARE

## 2020-09-11 ENCOUNTER — APPOINTMENT (OUTPATIENT)
Dept: CT IMAGING | Facility: CLINIC | Age: 80
End: 2020-09-11
Payer: MEDICARE

## 2020-09-11 DIAGNOSIS — Z00.8 ENCOUNTER FOR OTHER GENERAL EXAMINATION: ICD-10-CM

## 2020-09-11 DIAGNOSIS — Z98.49 CATARACT EXTRACTION STATUS, UNSPECIFIED EYE: Chronic | ICD-10-CM

## 2020-09-11 DIAGNOSIS — Z98.1 ARTHRODESIS STATUS: Chronic | ICD-10-CM

## 2020-09-11 DIAGNOSIS — Z96.659 PRESENCE OF UNSPECIFIED ARTIFICIAL KNEE JOINT: Chronic | ICD-10-CM

## 2020-09-11 DIAGNOSIS — Z96.641 PRESENCE OF RIGHT ARTIFICIAL HIP JOINT: Chronic | ICD-10-CM

## 2020-09-11 DIAGNOSIS — Z98.891 HISTORY OF UTERINE SCAR FROM PREVIOUS SURGERY: Chronic | ICD-10-CM

## 2020-09-11 PROCEDURE — 74177 CT ABD & PELVIS W/CONTRAST: CPT

## 2020-09-11 PROCEDURE — 74177 CT ABD & PELVIS W/CONTRAST: CPT | Mod: 26

## 2020-09-11 PROCEDURE — 82565 ASSAY OF CREATININE: CPT

## 2021-03-23 ENCOUNTER — APPOINTMENT (OUTPATIENT)
Dept: CT IMAGING | Facility: CLINIC | Age: 81
End: 2021-03-23
Payer: MEDICARE

## 2021-03-23 ENCOUNTER — OUTPATIENT (OUTPATIENT)
Dept: OUTPATIENT SERVICES | Facility: HOSPITAL | Age: 81
LOS: 1 days | End: 2021-03-23
Payer: MEDICARE

## 2021-03-23 DIAGNOSIS — Z96.641 PRESENCE OF RIGHT ARTIFICIAL HIP JOINT: Chronic | ICD-10-CM

## 2021-03-23 DIAGNOSIS — Z96.659 PRESENCE OF UNSPECIFIED ARTIFICIAL KNEE JOINT: Chronic | ICD-10-CM

## 2021-03-23 DIAGNOSIS — Z98.1 ARTHRODESIS STATUS: Chronic | ICD-10-CM

## 2021-03-23 DIAGNOSIS — Z98.49 CATARACT EXTRACTION STATUS, UNSPECIFIED EYE: Chronic | ICD-10-CM

## 2021-03-23 DIAGNOSIS — Z98.891 HISTORY OF UTERINE SCAR FROM PREVIOUS SURGERY: Chronic | ICD-10-CM

## 2021-03-23 DIAGNOSIS — Z00.8 ENCOUNTER FOR OTHER GENERAL EXAMINATION: ICD-10-CM

## 2021-03-23 PROCEDURE — 74177 CT ABD & PELVIS W/CONTRAST: CPT

## 2021-03-23 PROCEDURE — 82565 ASSAY OF CREATININE: CPT

## 2021-03-23 PROCEDURE — 74177 CT ABD & PELVIS W/CONTRAST: CPT | Mod: 26,MH

## 2021-05-26 ENCOUNTER — OUTPATIENT (OUTPATIENT)
Dept: OUTPATIENT SERVICES | Facility: HOSPITAL | Age: 81
LOS: 1 days | End: 2021-05-26
Payer: MEDICARE

## 2021-05-26 ENCOUNTER — APPOINTMENT (OUTPATIENT)
Dept: MRI IMAGING | Facility: CLINIC | Age: 81
End: 2021-05-26
Payer: MEDICARE

## 2021-05-26 DIAGNOSIS — Z98.1 ARTHRODESIS STATUS: Chronic | ICD-10-CM

## 2021-05-26 DIAGNOSIS — Z96.659 PRESENCE OF UNSPECIFIED ARTIFICIAL KNEE JOINT: Chronic | ICD-10-CM

## 2021-05-26 DIAGNOSIS — Z98.891 HISTORY OF UTERINE SCAR FROM PREVIOUS SURGERY: Chronic | ICD-10-CM

## 2021-05-26 DIAGNOSIS — Z00.8 ENCOUNTER FOR OTHER GENERAL EXAMINATION: ICD-10-CM

## 2021-05-26 DIAGNOSIS — Z98.49 CATARACT EXTRACTION STATUS, UNSPECIFIED EYE: Chronic | ICD-10-CM

## 2021-05-26 DIAGNOSIS — Z96.641 PRESENCE OF RIGHT ARTIFICIAL HIP JOINT: Chronic | ICD-10-CM

## 2021-05-26 PROCEDURE — 73221 MRI JOINT UPR EXTREM W/O DYE: CPT

## 2021-05-26 PROCEDURE — 73221 MRI JOINT UPR EXTREM W/O DYE: CPT | Mod: 26,LT,MH

## 2021-08-23 ENCOUNTER — APPOINTMENT (OUTPATIENT)
Age: 81
End: 2021-08-23

## 2021-09-15 ENCOUNTER — APPOINTMENT (OUTPATIENT)
Dept: ORTHOPEDIC SURGERY | Facility: CLINIC | Age: 81
End: 2021-09-15
Payer: MEDICARE

## 2021-09-15 ENCOUNTER — NON-APPOINTMENT (OUTPATIENT)
Age: 81
End: 2021-09-15

## 2021-09-15 ENCOUNTER — APPOINTMENT (OUTPATIENT)
Dept: VASCULAR SURGERY | Facility: CLINIC | Age: 81
End: 2021-09-15

## 2021-09-15 DIAGNOSIS — M67.88 OTHER SPECIFIED DISORDERS OF SYNOVIUM AND TENDON, OTHER SITE: ICD-10-CM

## 2021-09-15 DIAGNOSIS — M76.71 PERONEAL TENDINITIS, RIGHT LEG: ICD-10-CM

## 2021-09-15 PROCEDURE — 99214 OFFICE O/P EST MOD 30 MIN: CPT

## 2021-09-15 NOTE — PHYSICAL EXAM
[de-identified] : General: Alert and oriented x3. In no acute distress. Pleasant in nature with a normal affect. No apparent respiratory distress.\par \par Left Foot and LE Exam\par Skin: Clean, dry, intact\par Inspection: No obvious malalignment, no masses, + diffuse swelling on the LE and the peroneals, + erythema noted,  no effusion\par Pulses: 2+ DP/PT pulses\par ROM: FOOT Full  ROM of digits, ANKLE 10 degrees of dorsiflexion, 40 degrees of plantarflexion, 10 degrees of subtalar motion.\par Painful ROM: None\par Tenderness: No tenderness over the medial malleolus, No tenderness over the lateral malleolus, no CFL/ATFL/PTFL pain, no deltoid ligament pain. No heel pain. No Achilles tenderness. No 5th metatarsal pain. No pain to the LisFranc joint. No ttp over the posterior tibial tendon. + tenderness the peroneals + tenderness to the distal fibula\par Stability: Negative anterior/posterior drawer.\par Strength: 5/5 ADD/ABD/TA/GS/EHL/FHL/EDL\par Neuro: Sensation in tact to light touch throughout\par Additional tests: Negative Mortons test, negative tarsal tunnel tinels, negative single heel rise.\par \par Right Foot and Ankle Exam\par Skin: Clean, dry, intact\par Inspection: No obvious malalignment, no masses, + swelling, no effusion\par Pulses: 2+ DP/PT pulses\par Neuro: Sensation in tact to light touch throughout [de-identified] : MRI of left ankle non contrast done on 8/10/2021 results reviewed 9/15/2021, results as reported in the chart:\par Impressions:\par 1. Longitudinal split of peroneus brevis at the level of the lateral malleolus\par 2. Mild tendinosis and tenosynovitis of the peroneus brevis longus tendon\par 3. Soft tissue edema around the ankle\par 4. Ganglion cyst at the level of the extensor retinaculum\par \par Signed by Neno Jackson MD 8/12/2021 4: 17 PM EDT

## 2021-09-15 NOTE — REASON FOR VISIT
[Initial Visit] : an initial visit for [Family Member] : family member [FreeTextEntry2] : left ankle and calf pain

## 2021-09-15 NOTE — HISTORY OF PRESENT ILLNESS
[FreeTextEntry1] : 9/15/2021: JESENIA MCFARLAND is a 81 year old female presenting for an initial evaluation of  pain. The patient’s pain is noted to be a 7-8/10. The patient confirms that she saw Dr. Oliveros for the same issue. She is at the clinic today for a second evaluation.  She confirms a significant medical diagnosis of RSD and Diabetes. She states that her LE are worsening in terms of pain, swelling, and erythema. The patient is accompanied by their daughter. She presents to the clinic in slip-on supportive shoes. No other complaints.

## 2021-09-15 NOTE — DISCUSSION/SUMMARY
[de-identified] : Today I had a lengthy discussion with the patient regarding their bilateral LE pain, R > L.I have addressed all the patient's concerns surrounding the pathology of their condition. MRI results were reviewed with the patient and her daughter today. Given the patient's medical history of RSD and Diabetes, I advised against operative intervention options at this time. I recommended that the patient undergo a course of physical therapy for the bilateral ankles and LE  2-3 times a week for a total of 6-8 weeks. A prescription was given for the physical therapy today. She may continue to WBAT. \par \par At this time, I am also recommending that the patient seek further evaluation with a pain management specialist. A referral was given to the patient and her daughter in the clinic today.		\par \par The patient understood and verbally agreed to the treatment plan. All of their questions were answered and they were satisfied with the visit. The patient should call the office if they have any questions or experience worsening symptoms. I would like to see the patient back in the office prn  to reassess their condition. 				\par

## 2021-09-15 NOTE — ADDENDUM
[FreeTextEntry1] : I, Maeghan Bronson, acted solely as a scribe for Dr. Gokul Bansal on this date 09/15/2021.\par \par All medical record entries made by the Scribe were at my, Dr. Gokul Bansal, direction and personally dictated by me on 09/15/2021 . I have reviewed the chart and agree that the record accurately reflects my personal performance of the history, physical exam, assessment and plan. I have also personally directed, reviewed, and agreed with the chart.	\par

## 2022-03-01 ENCOUNTER — OUTPATIENT (OUTPATIENT)
Dept: OUTPATIENT SERVICES | Facility: HOSPITAL | Age: 82
LOS: 1 days | End: 2022-03-01
Payer: MEDICARE

## 2022-03-01 VITALS
RESPIRATION RATE: 19 BRPM | HEIGHT: 66 IN | TEMPERATURE: 98 F | HEART RATE: 60 BPM | WEIGHT: 177.91 LBS | DIASTOLIC BLOOD PRESSURE: 69 MMHG | SYSTOLIC BLOOD PRESSURE: 133 MMHG | OXYGEN SATURATION: 99 %

## 2022-03-01 VITALS
RESPIRATION RATE: 9 BRPM | HEART RATE: 56 BPM | SYSTOLIC BLOOD PRESSURE: 120 MMHG | OXYGEN SATURATION: 97 % | DIASTOLIC BLOOD PRESSURE: 51 MMHG

## 2022-03-01 DIAGNOSIS — K21.9 GASTRO-ESOPHAGEAL REFLUX DISEASE WITHOUT ESOPHAGITIS: ICD-10-CM

## 2022-03-01 DIAGNOSIS — Z98.49 CATARACT EXTRACTION STATUS, UNSPECIFIED EYE: Chronic | ICD-10-CM

## 2022-03-01 DIAGNOSIS — Z96.659 PRESENCE OF UNSPECIFIED ARTIFICIAL KNEE JOINT: Chronic | ICD-10-CM

## 2022-03-01 DIAGNOSIS — Z96.641 PRESENCE OF RIGHT ARTIFICIAL HIP JOINT: Chronic | ICD-10-CM

## 2022-03-01 DIAGNOSIS — Z96.642 PRESENCE OF LEFT ARTIFICIAL HIP JOINT: Chronic | ICD-10-CM

## 2022-03-01 DIAGNOSIS — Z98.1 ARTHRODESIS STATUS: Chronic | ICD-10-CM

## 2022-03-01 DIAGNOSIS — R13.10 DYSPHAGIA, UNSPECIFIED: ICD-10-CM

## 2022-03-01 DIAGNOSIS — Z98.891 HISTORY OF UTERINE SCAR FROM PREVIOUS SURGERY: Chronic | ICD-10-CM

## 2022-03-01 LAB — GLUCOSE BLDC GLUCOMTR-MCNC: 89 MG/DL — SIGNIFICANT CHANGE UP (ref 70–99)

## 2022-03-01 PROCEDURE — 82962 GLUCOSE BLOOD TEST: CPT

## 2022-03-01 PROCEDURE — 43236 UPPR GI SCOPE W/SUBMUC INJ: CPT

## 2022-03-01 PROCEDURE — 94640 AIRWAY INHALATION TREATMENT: CPT

## 2022-03-01 RX ORDER — LIDOCAINE HCL 20 MG/ML
4 VIAL (ML) INJECTION ONCE
Refills: 0 | Status: COMPLETED | OUTPATIENT
Start: 2022-03-01 | End: 2022-03-01

## 2022-03-01 RX ORDER — SODIUM CHLORIDE 9 MG/ML
500 INJECTION INTRAMUSCULAR; INTRAVENOUS; SUBCUTANEOUS
Refills: 0 | Status: COMPLETED | OUTPATIENT
Start: 2022-03-01 | End: 2022-03-01

## 2022-03-01 RX ADMIN — Medication 4 MILLILITER(S): at 09:23

## 2022-03-01 RX ADMIN — SODIUM CHLORIDE 30 MILLILITER(S): 9 INJECTION INTRAMUSCULAR; INTRAVENOUS; SUBCUTANEOUS at 09:26

## 2022-03-01 NOTE — ASU DISCHARGE PLAN (ADULT/PEDIATRIC) - CARE PROVIDER_API CALL
Stewart Peacock  GASTROENTEROLOGY  [address], [address]  [place], [place] [id number]  Phone: [phone]  Fax: [phone]  Established Patient  Follow Up Time:

## 2022-03-01 NOTE — ASU DISCHARGE PLAN (ADULT/PEDIATRIC) - MODE OF TRANSPORTATION
[facility]  [place] PATIENT NUTRITION    Dear BRIAN Benjamin,    I had the pleasure of seeing Miles Reyes at the [facility] in 7/15/2020.    DIAGNOSIS/REASON FOR FOLLOW UP VISIT   Miles 11 year old, female seen today to discuss   Chief Complaint   Patient presents with   • Follow-up     Atherogenic dyslipidemia, elevated liver enzyme, and elevated insulin       Estimated body mass index is 31.65 kg/m² as calculated from the following:    Height as of this encounter: 4' 10.5\" (1.486 m).    Weight as of this encounter: 69.9 kg.     Wt Readings from Last 3 Encounters:   07/14/20 (!) 69.9 kg (>99 %, Z= 2.95)*   10/17/19 (!) 62.3 kg (>99 %, Z= 2.92)*   10/07/19 (!) 60.5 kg (>99 %, Z= 2.86)*     * Growth percentiles are based on CDC (Girls, 2-20 Years) data.        PAST MEDICAL HISTORY  Miles  has no past medical history on file.   No current outpatient medications on file.     No current facility-administered medications for this visit.        LABS:   Results for MILES REYES (MRN [id number]) as of 7/12/2020 15:03   Ref. Range 10/19/2018 09:55 5/29/2019 07:33 9/20/2019 07:21 7/6/2020 10:21 7/6/2020 10:21   FASTING STATUS Latest Units: hrs 12 12 8     Fasting Status Latest Units: Hours 12 12 8 12 12   CHOLESTEROL Latest Ref Range: <=169 mg/dL 187 (H) 179 (H) 174 (H) 187 (H)    CHOL/HDL Latest Ref Range: <=4.4  4.1 4.3 3.6 4.5 (H)    HDL Latest Ref Range: >=46 mg/dL 46 42 (L) 48 42 (L)    CALCULATED LDL Latest Ref Range: <=109 mg/dL 120 (H) 116 (H) 103 109    CALCULATED NON HDL Latest Ref Range: <=119 mg/dL 141 (H) 137 (H) 126 (H) 145 (H)    TRIGLYCERIDE Latest Ref Range: <=74 mg/dL 105 (H) 105 (H) 116 (H) 181 (H)    INSULIN, FASTING Latest Ref Range: 3 - 28 mUnits/L  79 (H) 62 (H)  45 (H)   Glucose Latest Ref Range: 65 - 99 mg/dL 82 93 94 77    GLYCOHEMOGLOBIN A1C  Latest Ref Range: 4.5 - 5.6 %  5.0 5.3 5.5    Results for MILES HARRY (MRN [id number]) as of 7/12/2020 15:03   Ref. Range 10/19/2018 09:55 5/29/2019 07:33 9/20/2019 07:21 7/6/2020 10:21   ALT/SGPT Latest Ref Range: 10 - 30 Units/L 41 (H) 42 (H) 53 (H) 45 (H)     EXERCISE/ACTIVITY LEVEL: biking    FOOD and NUTRITION RELATED HISTORY: Miles is here with her Grandma and two older sisters to follow up for her atherogenic dyslipidemia, elevated liver enzyme, and insulin resistance.  She was last seen in Oct. 2019 in [facility] and since that time, many of her labs worsened.  Her HDL remains low (42), her Triglycerides bumped up (181), and her A1C increased (5.5).  Her insulin however, did show some drop (45), but remains elevated and her ALT also showed a decrease (45), but remains elevated.  Miles is always eating, per Grandma and this is likely due to a combination of boredom eating and a high intake of refined carbohydrates with very little protein intake.  Basic information reviewed to help decrease the refined carbohydrates and ideas were provided to help increase protein.  Miles loves vegetables and we focused on helping her increase her intake at meals and snacks.      NUTRITION DIAGNOSIS:  Altered nutrition-related laboratory values related to atherogenic dyslipidemia, insulin resistance, and elevated liver enzyme as evidenced by HDL (42), Triglycerides (181), A1C (5.5), Insulin (45), and ALT (45).      Nutrition Goals for next visit:  1. Try to make sure all of your meals contain protein, such as nuts, string cheese, eggs, chicken, and beans/lentils.     Exercise Goals for next visit:  1. Try to bike at least 3 days/week with your sisters.    MONITORING/FOLLOW-UP: Will plan to follow up in [facility] in 6 months. Total face to face time spent with this patient was 15 minutes.      Sincerely,      Blanca De La Vega, LIBERTY, CSP, CD   Wheelchair/Stroller

## 2022-03-01 NOTE — ASU PATIENT PROFILE, ADULT - FALL HARM RISK - HARM RISK INTERVENTIONS

## 2022-03-01 NOTE — PRE PROCEDURE NOTE - PRE PROCEDURE EVALUATION
Pre-Endoscopy Evaluation      Referring Physician:  Stewart Peacock M.D.                          Procedure:  EGD/Botox    Indication for Procedure:  Dysphagia    Pertinent History:  EGJOO    Sedation by Anesthesia [x]    PAST MEDICAL & SURGICAL HISTORY:  Dysphagia    Hyperlipidemia    Hypertension    Coronary artery disease    Presence of stent in artery  x2 2005 , 10/2018 x 1, 04/2019 x 1 on Plavix, Asa    Type 2 diabetes mellitus    Chronic constipation    Glaucoma    Migraine headache    Osteoarthritis    VITO on CPAP    GERD (gastroesophageal reflux disease)    History of gastric ulcer  duodenal ulcer     History of hepatitis A  1968    Anxiety    RSD (reflex sympathetic dystrophy)  Chronic pain, both knees L&gt;R, back, not taking pain medications    Former smoker    Hard of hearing    Hypothyroidism due to Hashimoto&#x27;s thyroiditis    S/P knee replacement  x 2  and 2010    S/P laminectomy with spinal fusion  2011    S/P hip replacement, right  2016    S/P   x2    H/O cataract extraction    S/P hip replacement, left        PMH of Gastroparesis [ ]  Gastric Surgery [ ]  Gastric Outlet Obstruction [ ]  None [x]    Allergies    penicillin (Rash)    Intolerances        Latex allergy: [ ] yes [x] no    Home Medications:  Amitiza 24 mcg oral capsule: 1 cap(s) orally once a day (at bedtime) (01 Mar 2022 08:59)  aspirin 81 mg oral tablet: 1 tab(s) orally once a day (at bedtime) continue  (01 Mar 2022 08:59)  Dexilant 60 mg oral delayed release capsule: 1 cap(s) orally once a day (01 Mar 2022 08:59)  ezetimibe 10 mg oral tablet: 1 tab(s) orally once a day (at bedtime) (01 Mar 2022 08:59)  ezetimibe-simvastatin 10 mg-40 mg oral tablet: 1 tab(s) orally once a day (01 Mar 2022 08:59)  levothyroxine 75 mcg (0.075 mg) oral capsule: 1 cap(s) orally once a day (01 Mar 2022 08:59)  Lexapro 20 mg oral tablet: 1 tab(s) orally once a day (at bedtime) (01 Mar 2022 08:59)  Maxalt 10 mg oral tablet: 1 tab(s) orally once a day (01 Mar 2022 08:59)  metFORMIN 500 mg oral tablet: 1 tab(s) orally 2 times a day ( hold the morning of procedure )  (01 Mar 2022 08:59)  naratriptan 2.5 mg oral tablet: 1 tab(s) orally once a day, As Needed for Migraine HA (01 Mar 2022 08:59)  Plavix 75 mg oral tablet: 1 tab(s) orally once a day (at bedtime) continue  (01 Mar 2022 08:59)  PreserVision oral capsule: 1 cap(s) orally once a day (01 Mar 2022 08:59)  Restasis 0.05% ophthalmic emulsion: 1 drop(s) to each affected eye every 12 hours (01 Mar 2022 08:)  Simbrinza 1%- 0.2% ophthalmic suspension: 1 drop(s) to each affected eye 2 times a day (01 Mar 2022 08:59)  simvastatin 20 mg oral tablet: 1 tab(s) orally once a day (at bedtime) (01 Mar 2022 08:59)  triamterene: 1  orally once a day (01 Mar 2022 08:59)  triamterene-hydrochlorothiazide 37.5 mg-25 mg oral tablet: 1 tab(s) orally once a day (01 Mar 2022 08:59)  Vitron-C: 1  orally once a day (01 Mar 2022 08:59)    Medications:MEDICATIONS  (STANDING):  sodium chloride 0.9%. 500 milliLiter(s) (30 mL/Hr) IV Continuous <Continuous>    MEDICATIONS  (PRN):      Smoking: [ ] yes  [x] no    AICD/PPM: [ ] yes   [x] no    Pertinent lab data:                        Physical Examination:  Daily Height in cm: 167.64 (01 Mar 2022 09:)    Daily   Vital Signs Last 24 Hrs  T(C): 36.4 (01 Mar 2022 09:02), Max: 36.4 (01 Mar 2022 09:02)  T(F): 97.6 (01 Mar 2022 09:02), Max: 97.6 (01 Mar 2022 09:02)  HR: 60 (01 Mar 2022 09:02) (60 - 60)  BP: 133/69 (01 Mar 2022 09:02) (133/69 - 133/69)  BP(mean): --  RR: 19 (01 Mar 2022 09:02) (19 - 19)  SpO2: 99% (01 Mar 2022 09:02) (99% - 99%)      Constitutional: NAD    HEENT: PERRLA, EOMI,       Neck:  No JVD    Respiratory: CTAB/L    Cardiovascular: S1 and S2    Gastrointestinal: BS+, soft, NT/ND    Extremities: No peripheral edema    Neurological: A/O x 3, no focal deficits    Psychiatric: Normal mood, normal affect    : No Herrera    Skin: No rashes    Comments:    ASA Class: I [ ]  II [x]  III [ ]  IV [ ]

## 2022-03-01 NOTE — PRE PROCEDURE NOTE - TIME BILLING
Stewart Peacock MD, FACP, FACG, AGAF  [facility]  [phone]     After hours and weekend coverage GI service : [phone]

## 2022-03-01 NOTE — ASU DISCHARGE PLAN (ADULT/PEDIATRIC) - NS MD DC FALL RISK RISK
For information on Fall & Injury Prevention, visit: [email or web address] OR  [email or web address] OR  https://www.cdc.gov/steadi/patient.html

## 2022-03-01 NOTE — ASU PATIENT PROFILE, ADULT - NSICDXPASTSURGICALHX_GEN_ALL_CORE_FT
PAST SURGICAL HISTORY:  H/O cataract extraction     S/P  x2    S/P hip replacement, left     S/P hip replacement, right 2016    S/P knee replacement x 2  and     S/P laminectomy with spinal fusion 2011

## 2022-03-01 NOTE — ASU DISCHARGE PLAN (ADULT/PEDIATRIC) - FOR NEXT 24 HOURS DO NOT:
Received call from Haven Bryson at [facility] with [employer]. Subjective: Caller states \"abd and back pain with deep breath no sob\"     Current Symptoms: had hernia sugery on the 18th for hiatal hernia, states having pain with deep breath in upper abd and into back, stated called surgeon office and they could not see her till mar 2nd and was told if worse to go to er, having some diarrhea on clear liquids diet due to surgery denies sob but pain with deep breath or cough    Onset: Tuesday     Associated Symptoms: NA    Pain Severity: 5/10    Temperature: none    What has been tried: tylenol    LMP: NA Pregnant: NA    Recommended disposition: See in Office Today    Care advice provided, patient verbalizes understanding; denies any other questions or concerns; instructed to call back for any new or worsening symptoms. Patient/Caller agrees with recommended disposition; writer provided warm transfer to felecia at [facility] for appointment scheduling     Attention Provider: Thank you for allowing me to participate in the care of your patient. The patient was connected to triage in response to information provided to the ECC/PSC. Please do not respond through this encounter as the response is not directed to a shared pool.         Reason for Disposition   Age > 60 years    Protocols used: ABDOMINAL PAIN - UPPER-ADULT-OH Statement Selected

## 2022-08-19 ENCOUNTER — APPOINTMENT (OUTPATIENT)
Dept: ANESTHESIOLOGY | Facility: CLINIC | Age: 82
End: 2022-08-19

## 2022-08-19 ENCOUNTER — OUTPATIENT (OUTPATIENT)
Dept: OUTPATIENT SERVICES | Facility: HOSPITAL | Age: 82
LOS: 1 days | End: 2022-08-19
Payer: MEDICARE

## 2022-08-19 DIAGNOSIS — Z98.49 CATARACT EXTRACTION STATUS, UNSPECIFIED EYE: Chronic | ICD-10-CM

## 2022-08-19 DIAGNOSIS — Z98.891 HISTORY OF UTERINE SCAR FROM PREVIOUS SURGERY: Chronic | ICD-10-CM

## 2022-08-19 DIAGNOSIS — Z96.641 PRESENCE OF RIGHT ARTIFICIAL HIP JOINT: Chronic | ICD-10-CM

## 2022-08-19 DIAGNOSIS — Z98.1 ARTHRODESIS STATUS: Chronic | ICD-10-CM

## 2022-08-19 DIAGNOSIS — M54.16 RADICULOPATHY, LUMBAR REGION: ICD-10-CM

## 2022-08-19 DIAGNOSIS — Z96.659 PRESENCE OF UNSPECIFIED ARTIFICIAL KNEE JOINT: Chronic | ICD-10-CM

## 2022-08-19 DIAGNOSIS — Z96.642 PRESENCE OF LEFT ARTIFICIAL HIP JOINT: Chronic | ICD-10-CM

## 2022-08-19 PROCEDURE — 64483 NJX AA&/STRD TFRM EPI L/S 1: CPT

## 2022-09-10 NOTE — H&P PST ADULT - ABILITY TO HEAR (WITH HEARING AID OR HEARING APPLIANCE IF NORMALLY USED):
Unable to reach patient after 2 attempts.  Left voice message at phone number given (#[phone]).    Reason for Disposition  • Second attempt to contact caller AND no contact made. Phone number verified.    Protocols used: NO CONTACT OR DUPLICATE CONTACT CALL-A-    No future appointments.   Mildly to Moderately Impaired: difficulty hearing in some environments or speaker may need to increase volume or speak distinctly

## 2022-11-29 ENCOUNTER — TRANSCRIPTION ENCOUNTER (OUTPATIENT)
Age: 82
End: 2022-11-29

## 2022-11-29 ENCOUNTER — OUTPATIENT (OUTPATIENT)
Dept: OUTPATIENT SERVICES | Facility: HOSPITAL | Age: 82
LOS: 1 days | End: 2022-11-29
Payer: MEDICARE

## 2022-11-29 VITALS
DIASTOLIC BLOOD PRESSURE: 72 MMHG | WEIGHT: 175.93 LBS | HEIGHT: 64 IN | SYSTOLIC BLOOD PRESSURE: 155 MMHG | OXYGEN SATURATION: 98 % | HEART RATE: 63 BPM | RESPIRATION RATE: 18 BRPM | TEMPERATURE: 97 F

## 2022-11-29 VITALS
OXYGEN SATURATION: 97 % | HEART RATE: 60 BPM | SYSTOLIC BLOOD PRESSURE: 155 MMHG | RESPIRATION RATE: 20 BRPM | DIASTOLIC BLOOD PRESSURE: 71 MMHG

## 2022-11-29 DIAGNOSIS — K29.70 GASTRITIS, UNSPECIFIED, WITHOUT BLEEDING: ICD-10-CM

## 2022-11-29 DIAGNOSIS — Z96.641 PRESENCE OF RIGHT ARTIFICIAL HIP JOINT: Chronic | ICD-10-CM

## 2022-11-29 DIAGNOSIS — Z98.1 ARTHRODESIS STATUS: Chronic | ICD-10-CM

## 2022-11-29 DIAGNOSIS — Z98.891 HISTORY OF UTERINE SCAR FROM PREVIOUS SURGERY: Chronic | ICD-10-CM

## 2022-11-29 DIAGNOSIS — Z96.659 PRESENCE OF UNSPECIFIED ARTIFICIAL KNEE JOINT: Chronic | ICD-10-CM

## 2022-11-29 DIAGNOSIS — Z98.49 CATARACT EXTRACTION STATUS, UNSPECIFIED EYE: Chronic | ICD-10-CM

## 2022-11-29 DIAGNOSIS — Z96.642 PRESENCE OF LEFT ARTIFICIAL HIP JOINT: Chronic | ICD-10-CM

## 2022-11-29 LAB — GLUCOSE BLDC GLUCOMTR-MCNC: 110 MG/DL — HIGH (ref 70–99)

## 2022-11-29 PROCEDURE — 82962 GLUCOSE BLOOD TEST: CPT

## 2022-11-29 PROCEDURE — 43236 UPPR GI SCOPE W/SUBMUC INJ: CPT

## 2022-11-29 RX ORDER — SODIUM CHLORIDE 9 MG/ML
500 INJECTION INTRAMUSCULAR; INTRAVENOUS; SUBCUTANEOUS
Refills: 0 | Status: COMPLETED | OUTPATIENT
Start: 2022-11-29 | End: 2022-11-29

## 2022-11-29 RX ADMIN — SODIUM CHLORIDE 30 MILLILITER(S): 9 INJECTION INTRAMUSCULAR; INTRAVENOUS; SUBCUTANEOUS at 09:33

## 2022-11-29 NOTE — ASU DISCHARGE PLAN (ADULT/PEDIATRIC) - NS MD DC FALL RISK RISK
For information on Fall & Injury Prevention, visit: [email or web address] OR  [email or web address] OR  https://www.cdc.gov/steadi/patient.html

## 2022-11-29 NOTE — ASU PATIENT PROFILE, ADULT - MENTAL HEALTH CONDITIONS/SYMPTOMS, PROFILE
none 17 yo healthy F nonsmoker presenting with 6 weeks of persistent cough, productive at times with clear nonbloody sputum and associated fever that began around 2 weeks ago, lasted only 1 day and resolved on own.  No medications trialed for symptoms.  Pt has had multiple negative COVID tests.  Denies exposures to Tb or known Tb patients.  No chest pain, pleurisy, hemoptysis, le edema, calf pain, sob or hx of DVT/PE or OCP use.

## 2022-11-29 NOTE — PRE PROCEDURE NOTE - TIME BILLING
Stewart Peacock MD, FACP, FACG, AGAF  [facility]  [phone]     After hours and weekend coverage GI service : [phone]

## 2022-11-29 NOTE — ASU PATIENT PROFILE, ADULT - FALL HARM RISK - HARM RISK INTERVENTIONS

## 2022-11-29 NOTE — PRE PROCEDURE NOTE - PRE PROCEDURE EVALUATION
Pre-Endoscopy Evaluation      Referring Physician:  Stewart Peacock M.D.                          Procedure:  EGD, Botox    Indication for Procedure:  Dysphagia    Pertinent History:      Sedation by Anesthesia [x]    PAST MEDICAL & SURGICAL HISTORY:  Dysphagia      Hyperlipidemia      Hypertension      Coronary artery disease      Presence of stent in artery  x2 2005 , 10/2018 x 1, 04/2019 x 1 on Plavix, Asa      Type 2 diabetes mellitus      Chronic constipation      Glaucoma      Migraine headache      Osteoarthritis      VITO on CPAP      GERD (gastroesophageal reflux disease)      History of gastric ulcer  duodenal ulcer       History of hepatitis A  1968      Anxiety      RSD (reflex sympathetic dystrophy)  Chronic pain, both knees L&gt;R, back, not taking pain medications      Former smoker      Hard of hearing      Hypothyroidism due to Hashimoto&#x27;s thyroiditis      S/P knee replacement  x 2  and 2010      S/P laminectomy with spinal fusion  2011      S/P hip replacement, right  2016      S/P   x2      H/O cataract extraction      S/P hip replacement, left          PMH of Gastroparesis [ ]  Gastric Surgery [ ]  Gastric Outlet Obstruction [ ]  None [x]    Allergies    adhesives (Rash)  penicillin (Rash)    Intolerances        Latex allergy: [ ] yes [x] no    Medications:MEDICATIONS  (STANDING):  sodium chloride 0.9%. 500 milliLiter(s) (30 mL/Hr) IV Continuous <Continuous>    MEDICATIONS  (PRN):    Home Medications:  Amitiza 24 mcg oral capsule: 1 cap(s) orally once a day (at bedtime) (2022 08:53)  aspirin 81 mg oral tablet: 1 tab(s) orally once a day (at bedtime) continue  (2022 08:53)  Dexilant 60 mg oral delayed release capsule: 1 cap(s) orally once a day (2022 08:53)  ezetimibe 10 mg oral tablet: 1 tab(s) orally once a day (at bedtime) (2022 08:53)  ezetimibe-simvastatin 10 mg-40 mg oral tablet: 1 tab(s) orally once a day (2022 08:53)  levothyroxine 75 mcg (0.075 mg) oral capsule: 1 cap(s) orally once a day (2022 08:53)  Lexapro 20 mg oral tablet: 1 tab(s) orally once a day (at bedtime) (2022 08:53)  Maxalt 10 mg oral tablet: 1 tab(s) orally once a day (2022 08:53)  metFORMIN 500 mg oral tablet: 1 tab(s) orally 2 times a day ( hold the morning of procedure )  (:53)  naratriptan 2.5 mg oral tablet: 1 tab(s) orally once a day, As Needed for Migraine HA (:53)  Plavix 75 mg oral tablet: 1 tab(s) orally once a day (at bedtime) continue  (53)  PreserVision oral capsule: 1 cap(s) orally once a day (:53)  Restasis 0.05% ophthalmic emulsion: 1 drop(s) to each affected eye every 12 hours ()  Simbrinza 1%- 0.2% ophthalmic suspension: 1 drop(s) to each affected eye 2 times a day (:53)  simvastatin 20 mg oral tablet: 1 tab(s) orally once a day (at bedtime) (:53)  triamterene: 1  orally once a day (:53)  triamterene-hydrochlorothiazide 37.5 mg-25 mg oral tablet: 1 tab(s) orally once a day (:53)  Vitron-C: 1  orally once a day (:53)    Smoking: [ ] yes  [x] no    AICD/PPM: [ ] yes   [x] no    Pertinent lab data:                        Physical Examination:  Daily Height in cm: 162.56 (2022 09:12)    Daily   Vital Signs Last 24 Hrs  T(C): 36.1 (2022 09:12), Max: 36.1 (2022 08:55)  T(F): 97 (2022 08:55), Max: 97 (2022 08:55)  HR: 62 (2022 09:15) (62 - 63)  BP: 105/59 (2022 09:15) (105/59 - 155/72)  BP(mean): --  RR: 18 (2022 09:15) (18 - 18)  SpO2: 100% (2022 09:15) (98% - 100%)    Parameters below as of 2022 09:15  Patient On (Oxygen Delivery Method): room air    Constitutional: NAD    HEENT: PERRLA, EOMI,       Neck:  No JVD    Respiratory: CTAB/L    Cardiovascular: S1 and S2    Gastrointestinal: BS+, soft, NT/ND    Extremities: No peripheral edema    Neurological: A/O x 3, no focal deficits    Psychiatric: Normal mood, normal affect    : No Herrera    Skin: No rashes    Comments:    ASA Class: I [ ]  II [x]  III [ ]  IV [ ]

## 2022-11-29 NOTE — ASU DISCHARGE PLAN (ADULT/PEDIATRIC) - CARE PROVIDER_API CALL
Stewart Peacock  GASTROENTEROLOGY  [address], [address]  [place], [place] [id number]  Phone: [phone]  Fax: [phone]  Established Patient  Follow Up Time:

## 2023-02-07 NOTE — ASU PREOP CHECKLIST - SPO2 (%)
89 Mother sts started vomiting at 2000, and sts concerned because pt was choking and vomiting in her sleep. Pt received benadryl at 1945 because mother was worried it was an allergic reaction. Mother denies fevers at home and sts normal UOP. Skin is warm and dry, resp are even and unlabored.

## 2023-03-28 RX ORDER — ZOLEDRONIC ACID 5 MG/100ML
5 INJECTION INTRAVENOUS
Qty: 0 | Refills: 0 | Status: COMPLETED | OUTPATIENT
Start: 2023-03-28 | End: 1900-01-01

## 2023-04-11 ENCOUNTER — NON-APPOINTMENT (OUTPATIENT)
Age: 83
End: 2023-04-11

## 2023-04-11 ENCOUNTER — APPOINTMENT (OUTPATIENT)
Dept: RHEUMATOLOGY | Facility: CLINIC | Age: 83
End: 2023-04-11
Payer: MEDICARE

## 2023-04-11 VITALS
OXYGEN SATURATION: 97 % | HEART RATE: 56 BPM | DIASTOLIC BLOOD PRESSURE: 74 MMHG | SYSTOLIC BLOOD PRESSURE: 146 MMHG | RESPIRATION RATE: 16 BRPM

## 2023-04-11 VITALS
HEART RATE: 62 BPM | TEMPERATURE: 97.8 F | RESPIRATION RATE: 16 BRPM | OXYGEN SATURATION: 98 % | DIASTOLIC BLOOD PRESSURE: 79 MMHG | OXYGEN SATURATION: 98 % | RESPIRATION RATE: 16 BRPM | DIASTOLIC BLOOD PRESSURE: 79 MMHG | TEMPERATURE: 97.8 F | HEART RATE: 62 BPM | SYSTOLIC BLOOD PRESSURE: 156 MMHG | SYSTOLIC BLOOD PRESSURE: 156 MMHG

## 2023-04-11 PROCEDURE — 96374 THER/PROPH/DIAG INJ IV PUSH: CPT | Mod: 59

## 2023-04-11 RX ORDER — ZOLEDRONIC ACID 5 MG/100ML
5 INJECTION INTRAVENOUS
Qty: 0 | Refills: 0 | Status: COMPLETED
Start: 2023-03-28

## 2023-04-18 ENCOUNTER — TRANSCRIPTION ENCOUNTER (OUTPATIENT)
Age: 83
End: 2023-04-18

## 2023-04-18 ENCOUNTER — OUTPATIENT (OUTPATIENT)
Dept: OUTPATIENT SERVICES | Facility: HOSPITAL | Age: 83
LOS: 1 days | End: 2023-04-18
Payer: MEDICARE

## 2023-04-18 VITALS
OXYGEN SATURATION: 99 % | HEART RATE: 57 BPM | SYSTOLIC BLOOD PRESSURE: 158 MMHG | HEIGHT: 64 IN | TEMPERATURE: 98 F | RESPIRATION RATE: 12 BRPM | WEIGHT: 175.05 LBS | DIASTOLIC BLOOD PRESSURE: 68 MMHG

## 2023-04-18 VITALS
OXYGEN SATURATION: 99 % | DIASTOLIC BLOOD PRESSURE: 64 MMHG | SYSTOLIC BLOOD PRESSURE: 125 MMHG | RESPIRATION RATE: 15 BRPM | HEART RATE: 55 BPM

## 2023-04-18 DIAGNOSIS — K29.70 GASTRITIS, UNSPECIFIED, WITHOUT BLEEDING: ICD-10-CM

## 2023-04-18 DIAGNOSIS — Z96.642 PRESENCE OF LEFT ARTIFICIAL HIP JOINT: Chronic | ICD-10-CM

## 2023-04-18 DIAGNOSIS — Z98.1 ARTHRODESIS STATUS: Chronic | ICD-10-CM

## 2023-04-18 DIAGNOSIS — Z96.641 PRESENCE OF RIGHT ARTIFICIAL HIP JOINT: Chronic | ICD-10-CM

## 2023-04-18 DIAGNOSIS — Z98.49 CATARACT EXTRACTION STATUS, UNSPECIFIED EYE: Chronic | ICD-10-CM

## 2023-04-18 DIAGNOSIS — Z98.891 HISTORY OF UTERINE SCAR FROM PREVIOUS SURGERY: Chronic | ICD-10-CM

## 2023-04-18 DIAGNOSIS — Z96.659 PRESENCE OF UNSPECIFIED ARTIFICIAL KNEE JOINT: Chronic | ICD-10-CM

## 2023-04-18 LAB — GLUCOSE BLDC GLUCOMTR-MCNC: 113 MG/DL — HIGH (ref 70–99)

## 2023-04-18 PROCEDURE — 43236 UPPR GI SCOPE W/SUBMUC INJ: CPT

## 2023-04-18 PROCEDURE — 82962 GLUCOSE BLOOD TEST: CPT

## 2023-04-18 RX ORDER — ONABOTULINUMTOXINA 100 UNIT
100 VIAL (EA) INJECTION ONCE
Refills: 0 | Status: COMPLETED | OUTPATIENT
Start: 2023-04-18 | End: 2023-04-18

## 2023-04-18 RX ORDER — PANTOPRAZOLE SODIUM 20 MG/1
1 TABLET, DELAYED RELEASE ORAL
Refills: 0 | DISCHARGE

## 2023-04-18 RX ORDER — SODIUM CHLORIDE 9 MG/ML
500 INJECTION INTRAMUSCULAR; INTRAVENOUS; SUBCUTANEOUS
Refills: 0 | Status: COMPLETED | OUTPATIENT
Start: 2023-04-18 | End: 2023-04-18

## 2023-04-18 RX ADMIN — SODIUM CHLORIDE 30 MILLILITER(S): 9 INJECTION INTRAMUSCULAR; INTRAVENOUS; SUBCUTANEOUS at 08:39

## 2023-04-18 RX ADMIN — Medication 100 UNIT(S): at 08:59

## 2023-04-18 NOTE — ASU DISCHARGE PLAN (ADULT/PEDIATRIC) - CARE PROVIDER_API CALL
Stewart Peacock)  Gastroenterology  [address] [place] [address], [address]  [place], [place] [id number]  Phone: [phone]  Fax: [phone]  Established Patient  Follow Up Time:

## 2023-04-18 NOTE — ASU DISCHARGE PLAN (ADULT/PEDIATRIC) - NS MD DC FALL RISK RISK
For information on Fall & Injury Prevention, visit: [email or web address] OR  [email or web address] OR  https://www.cdc.gov/steadi/patient.html

## 2023-04-18 NOTE — PRE PROCEDURE NOTE - TIME BILLING
Stewart Peacock MD, FACP, FACG, AGAF  [facility]  [phone]     After hours and weekend coverage GI service : [phone]

## 2023-04-18 NOTE — ASU PATIENT PROFILE, ADULT - FALL HARM RISK - HARM RISK INTERVENTIONS

## 2023-04-18 NOTE — PRE PROCEDURE NOTE - PRE PROCEDURE EVALUATION
Pre-Endoscopy Evaluation      Referring Physician:  Stewart Peacock M.D.                          Procedure:  EGD Botox    Indication for Procedure:  Dysphagia    Pertinent History:      Sedation by Anesthesia [x]    PAST MEDICAL & SURGICAL HISTORY:  Dysphagia      Hyperlipidemia      Hypertension      Coronary artery disease      Presence of stent in artery  x2 2005 , 10/2018 x 1, 04/2019 x 1 on Plavix, Asa      Type 2 diabetes mellitus      Chronic constipation      Glaucoma      Migraine headache      Osteoarthritis      VITO on CPAP      GERD (gastroesophageal reflux disease)      History of gastric ulcer  duodenal ulcer       History of hepatitis A  1968      Anxiety      RSD (reflex sympathetic dystrophy)  Chronic pain, both knees L>R, back, not taking pain medications      Former smoker      Hard of hearing      Hypothyroidism due to Hashimoto's thyroiditis      S/P knee replacement  x 2  and 2010      S/P laminectomy with spinal fusion  2011      S/P hip replacement, right  2016      S/P   x2      H/O cataract extraction      S/P hip replacement, left          PMH of Gastroparesis [ ]  Gastric Surgery [ ]  Gastric Outlet Obstruction [ ]  None [x]    Allergies    adhesives (Rash)  penicillin (Rash)    Intolerances        Latex allergy: [ ] yes [x] no    Medications:MEDICATIONS  (STANDING):  sodium chloride 0.9%. 500 milliLiter(s) (30 mL/Hr) IV Continuous <Continuous>    MEDICATIONS  (PRN):    Home Medications:  Amitiza 24 mcg oral capsule: 1 cap(s) orally once a day (at bedtime) (2023 08:16)  aspirin 81 mg oral tablet: 1 tab(s) orally once a day (at bedtime) continue  (2023 08:16)  cycloSPORINE 0.05% ophthalmic emulsion: 1 in each affected eye 2 times a day (2023 08:23)  Dexilant 60 mg oral delayed release capsule: 1 cap(s) orally once a day (2023 08:16)  DilTIAZem (Eqv-Cardizem CD) 180 mg/24 hours oral capsule, extended release: 1 orally once a day (2023 08:23)  ezetimibe 10 mg oral tablet: 1 tab(s) orally once a day (at bedtime) (2023 08:16)  ezetimibe-simvastatin 10 mg-40 mg oral tablet: 1 tab(s) orally once a day (2023 08:16)  levothyroxine 75 mcg (0.075 mg) oral capsule: 1 cap(s) orally once a day (2023 08:16)  Lexapro 20 mg oral tablet: 1 tab(s) orally once a day (at bedtime) (2023 08:16)  Maxalt 10 mg oral tablet: 1 tab(s) orally once a day (2023 08:16)  metFORMIN 500 mg oral tablet: 1 tab(s) orally 2 times a day ( hold the morning of procedure )  (2023 08:16)  naratriptan 2.5 mg oral tablet: 1 tab(s) orally once a day, As Needed for Migraine HA (2023 08:16)  pantoprazole 40 mg oral delayed release tablet: 1 orally once a day (2023 08:23)  Plavix 75 mg oral tablet: 1 tab(s) orally once a day (at bedtime) continue  (2023 08:16)  PreserVision oral capsule: 1 cap(s) orally once a day (2023 08:16)  Restasis 0.05% ophthalmic emulsion: 1 drop(s) to each affected eye every 12 hours (2023 08:16)  Simbrinza 1%- 0.2% ophthalmic suspension: 1 drop(s) to each affected eye 2 times a day (2023 08:16)  simvastatin 20 mg oral tablet: 1 tab(s) orally once a day (at bedtime) (2023 08:16)  triamterene: 1  orally once a day (2023 08:16)  triamterene-hydrochlorothiazide 37.5 mg-25 mg oral tablet: 1 tab(s) orally once a day (2023 08:16)  Vitron-C: 1  orally once a day (2023 08:16)    Smoking: [ ] yes  [x] no    AICD/PPM: [ ] yes   [x] no    Pertinent lab data:                        Physical Examination:  Daily Height in cm: 162.56 (2023 08:04)    Daily   Vital Signs Last 24 Hrs  T(C): 36.8 (2023 08:04), Max: 36.8 (2023 08:04)  T(F): 98.2 (2023 08:04), Max: 98.2 (2023 08:04)  HR: 57 (2023 08:04) (57 - 57)  BP: 158/68 (2023 08:04) (158/68 - 158/68)  BP(mean): --  RR: 12 (2023 08:04) (12 - 12)  SpO2: 99% (2023 08:04) (99% - 99%)    Parameters below as of 2023 08:04  Patient On (Oxygen Delivery Method): room air    Constitutional: NAD    HEENT: PERRLA, EOMI,       Neck:  No JVD    Respiratory: CTAB/L    Cardiovascular: S1 and S2    Gastrointestinal: BS+, soft, NT/ND    Extremities: No peripheral edema    Neurological: A/O x 3, no focal deficits    Psychiatric: Normal mood, normal affect    : No Herrera    Skin: No rashes    Comments:    ASA Class: I [ ]  II [x]  III [ ]  IV [ ]

## 2023-07-14 ENCOUNTER — APPOINTMENT (OUTPATIENT)
Dept: NEUROSURGERY | Facility: CLINIC | Age: 83
End: 2023-07-14

## 2023-07-25 NOTE — ASU DISCHARGE PLAN (ADULT/PEDIATRIC) - ACCOMPANIED BY
Problem: Potential for Falls  Goal: Patient will remain free of falls  Description: INTERVENTIONS:  - Educate patient/family on patient safety including physical limitations  - Instruct patient to call for assistance with activity   - Consult OT/PT to assist with strengthening/mobility   - Keep Call bell within reach  - Keep bed low and locked with side rails adjusted as appropriate  - Keep care items and personal belongings within reach  - Initiate and maintain comfort rounds  - Make Fall Risk Sign visible to staff  - Offer Toileting every 2 Hours, in advance of need  - Initiate/Maintain alarm  - Obtain necessary fall risk management equipment  - Apply yellow socks and bracelet for high fall risk patients  - Consider moving patient to room near nurses station  Outcome: Progressing     Problem: MOBILITY - ADULT  Goal: Maintain or return to baseline ADL function  Description: INTERVENTIONS:  -  Assess patient's ability to carry out ADLs; assess patient's baseline for ADL function and identify physical deficits which impact ability to perform ADLs (bathing, care of mouth/teeth, toileting, grooming, dressing, etc.)  - Assess/evaluate cause of self-care deficits   - Assess range of motion  - Assess patient's mobility; develop plan if impaired  - Assess patient's need for assistive devices and provide as appropriate  - Encourage maximum independence but intervene and supervise when necessary  - Involve family in performance of ADLs  - Assess for home care needs following discharge   - Consider OT consult to assist with ADL evaluation and planning for discharge  - Provide patient education as appropriate  Outcome: Progressing  Goal: Maintains/Returns to pre admission functional level  Description: INTERVENTIONS:  - Perform BMAT or MOVE assessment daily.   - Set and communicate daily mobility goal to care team and patient/family/caregiver.    - Collaborate with rehabilitation services on mobility goals if consulted  - Perform Range of Motion 3 times a day. - Reposition patient every 3 hours.   - Dangle patient 3 times a day  - Stand patient 3 times a day  - Ambulate patient 3 times a day  - Out of bed to chair 3 times a day   - Out of bed for meals 3 times a day  - Out of bed for toileting  - Record patient progress and toleration of activity level   Outcome: Progressing     Problem: Prexisting or High Potential for Compromised Skin Integrity  Goal: Skin integrity is maintained or improved  Description: INTERVENTIONS:  - Identify patients at risk for skin breakdown  - Assess and monitor skin integrity  - Assess and monitor nutrition and hydration status  - Monitor labs   - Assess for incontinence   - Turn and reposition patient  - Assist with mobility/ambulation  - Relieve pressure over bony prominences  - Avoid friction and shearing  - Provide appropriate hygiene as needed including keeping skin clean and dry  - Evaluate need for skin moisturizer/barrier cream  - Collaborate with interdisciplinary team   - Patient/family teaching  - Consider wound care consult   Outcome: Progressing     Problem: PAIN - ADULT  Goal: Verbalizes/displays adequate comfort level or baseline comfort level  Description: Interventions:  - Encourage patient to monitor pain and request assistance  - Assess pain using appropriate pain scale  - Administer analgesics based on type and severity of pain and evaluate response  - Implement non-pharmacological measures as appropriate and evaluate response  - Consider cultural and social influences on pain and pain management  - Notify physician/advanced practitioner if interventions unsuccessful or patient reports new pain  Outcome: Progressing     Problem: INFECTION - ADULT  Goal: Absence or prevention of progression during hospitalization  Description: INTERVENTIONS:  - Assess and monitor for signs and symptoms of infection  - Monitor lab/diagnostic results  - Monitor all insertion sites, i.e. indwelling lines, tubes, and drains  - Monitor endotracheal if appropriate and nasal secretions for changes in amount and color  - [place] appropriate cooling/warming therapies per order  - Administer medications as ordered  - Instruct and encourage patient and family to use good hand hygiene technique  - Identify and instruct in appropriate isolation precautions for identified infection/condition  Outcome: Progressing     Problem: DISCHARGE PLANNING  Goal: Discharge to home or other facility with appropriate resources  Description: INTERVENTIONS:  - Identify barriers to discharge w/patient and caregiver  - Arrange for needed discharge resources and transportation as appropriate  - Identify discharge learning needs (meds, wound care, etc.)  - Arrange for interpretive services to assist at discharge as needed  - Refer to Case Management Department for coordinating discharge planning if the patient needs post-hospital services based on physician/advanced practitioner order or complex needs related to functional status, cognitive ability, or social support system  Outcome: Progressing     Problem: Knowledge Deficit  Goal: Patient/family/caregiver demonstrates understanding of disease process, treatment plan, medications, and discharge instructions  Description: Complete learning assessment and assess knowledge base. Interventions:  - Provide teaching at level of understanding  - Provide teaching via preferred learning methods  Outcome: Progressing     Problem: Nutrition/Hydration-ADULT  Goal: Nutrient/Hydration intake appropriate for improving, restoring or maintaining nutritional needs  Description: Monitor and assess patient's nutrition/hydration status for malnutrition. Collaborate with interdisciplinary team and initiate plan and interventions as ordered. Monitor patient's weight and dietary intake as ordered or per policy. Utilize nutrition screening tool and intervene as necessary.  Determine patient's food preferences and provide high-protein, high-caloric foods as appropriate.      INTERVENTIONS:  - Monitor oral intake, urinary output, labs, and treatment plans  - Assess nutrition and hydration status and recommend course of action  - Evaluate amount of meals eaten  - Assist patient with eating if necessary   - Allow adequate time for meals  - Recommend/ encourage appropriate diets, oral nutritional supplements, and vitamin/mineral supplements  - Order, calculate, and assess calorie counts as needed  - Recommend, monitor, and adjust tube feedings and TPN/PPN based on assessed needs  - Assess need for intravenous fluids  - Provide specific nutrition/hydration education as appropriate  - Include patient/family/caregiver in decisions related to nutrition  Outcome: Progressing Family

## 2023-10-10 ENCOUNTER — OUTPATIENT (OUTPATIENT)
Dept: OUTPATIENT SERVICES | Facility: HOSPITAL | Age: 83
LOS: 1 days | End: 2023-10-10
Payer: MEDICARE

## 2023-10-10 ENCOUNTER — TRANSCRIPTION ENCOUNTER (OUTPATIENT)
Age: 83
End: 2023-10-10

## 2023-10-10 VITALS
OXYGEN SATURATION: 97 % | SYSTOLIC BLOOD PRESSURE: 130 MMHG | RESPIRATION RATE: 18 BRPM | HEART RATE: 56 BPM | DIASTOLIC BLOOD PRESSURE: 60 MMHG

## 2023-10-10 VITALS
SYSTOLIC BLOOD PRESSURE: 144 MMHG | OXYGEN SATURATION: 98 % | TEMPERATURE: 97 F | HEIGHT: 64 IN | WEIGHT: 175.05 LBS | HEART RATE: 55 BPM | DIASTOLIC BLOOD PRESSURE: 66 MMHG | RESPIRATION RATE: 18 BRPM

## 2023-10-10 DIAGNOSIS — Z98.891 HISTORY OF UTERINE SCAR FROM PREVIOUS SURGERY: Chronic | ICD-10-CM

## 2023-10-10 DIAGNOSIS — Z96.642 PRESENCE OF LEFT ARTIFICIAL HIP JOINT: Chronic | ICD-10-CM

## 2023-10-10 DIAGNOSIS — Z98.1 ARTHRODESIS STATUS: Chronic | ICD-10-CM

## 2023-10-10 DIAGNOSIS — Z98.49 CATARACT EXTRACTION STATUS, UNSPECIFIED EYE: Chronic | ICD-10-CM

## 2023-10-10 DIAGNOSIS — K21.9 GASTRO-ESOPHAGEAL REFLUX DISEASE WITHOUT ESOPHAGITIS: ICD-10-CM

## 2023-10-10 DIAGNOSIS — Z96.659 PRESENCE OF UNSPECIFIED ARTIFICIAL KNEE JOINT: Chronic | ICD-10-CM

## 2023-10-10 DIAGNOSIS — Z96.641 PRESENCE OF RIGHT ARTIFICIAL HIP JOINT: Chronic | ICD-10-CM

## 2023-10-10 LAB — GLUCOSE BLDC GLUCOMTR-MCNC: 115 MG/DL — HIGH (ref 70–99)

## 2023-10-10 PROCEDURE — 94640 AIRWAY INHALATION TREATMENT: CPT

## 2023-10-10 PROCEDURE — 43236 UPPR GI SCOPE W/SUBMUC INJ: CPT

## 2023-10-10 PROCEDURE — 82962 GLUCOSE BLOOD TEST: CPT

## 2023-10-10 DEVICE — NET RETRV ROT ROTH 2.5MMX230CM: Type: IMPLANTABLE DEVICE | Status: FUNCTIONAL

## 2023-10-10 RX ORDER — IPRATROPIUM/ALBUTEROL SULFATE 18-103MCG
3 AEROSOL WITH ADAPTER (GRAM) INHALATION ONCE
Refills: 0 | Status: COMPLETED | OUTPATIENT
Start: 2023-10-10 | End: 2023-10-10

## 2023-10-10 RX ORDER — SODIUM CHLORIDE 9 MG/ML
500 INJECTION INTRAMUSCULAR; INTRAVENOUS; SUBCUTANEOUS
Refills: 0 | Status: COMPLETED | OUTPATIENT
Start: 2023-10-10 | End: 2023-10-10

## 2023-10-10 RX ORDER — LIDOCAINE HCL 20 MG/ML
4 VIAL (ML) INJECTION ONCE
Refills: 0 | Status: COMPLETED | OUTPATIENT
Start: 2023-10-10 | End: 2023-10-10

## 2023-10-10 RX ADMIN — Medication 4 MILLILITER(S): at 08:25

## 2023-10-10 RX ADMIN — SODIUM CHLORIDE 30 MILLILITER(S): 9 INJECTION INTRAMUSCULAR; INTRAVENOUS; SUBCUTANEOUS at 09:05

## 2023-10-10 RX ADMIN — Medication 3 MILLILITER(S): at 08:25

## 2023-10-10 NOTE — PRE-ANESTHESIA EVALUATION ADULT - NSANTHOSAYNRD_GEN_A_CORE
No. VITO screening performed.  STOP BANG Legend: 0-2 = LOW Risk; 3-4 = INTERMEDIATE Risk; 5-8 = HIGH Risk

## 2023-10-10 NOTE — PRE PROCEDURE NOTE - PRE PROCEDURE EVALUATION
Pre-Endoscopy Evaluation      Referring Physician:  Stewart Peacock M.D.                          Procedure:  EGD/Botox    Indication for Procedure:  Dysphagia, EG outflow obstruction    Sedation by Anesthesia [x]    PAST MEDICAL & SURGICAL HISTORY:  Dysphagia      Hyperlipidemia      Hypertension      Coronary artery disease      Presence of stent in artery  x2 2005 , 10/2018 x 1, 04/2019 x 1 on Plavix, Asa      Type 2 diabetes mellitus      Chronic constipation      Glaucoma      Migraine headache      Osteoarthritis      VITO on CPAP      GERD (gastroesophageal reflux disease)      History of gastric ulcer  duodenal ulcer       History of hepatitis A  1968      Anxiety      RSD (reflex sympathetic dystrophy)  Chronic pain, both knees L>R, back, not taking pain medications      Former smoker      Hard of hearing      Hypothyroidism due to Hashimoto's thyroiditis      S/P knee replacement  x 2  and 2010      S/P laminectomy with spinal fusion  2011      S/P hip replacement, right  2016      S/P   x2      H/O cataract extraction      S/P hip replacement, left    PMH of Gastroparesis [ ]  Gastric Surgery [ ]  Esophago-Gastric Outlet Obstruction [x]    Allergies    adhesives (Rash)  penicillin (Rash)    Latex allergy: [ ] yes [x] no    Medications:MEDICATIONS  (STANDING):  sodium chloride 0.9%. 500 milliLiter(s) (30 mL/Hr) IV Continuous <Continuous>    MEDICATIONS  (PRN):    Home Medications:  Amitiza 24 mcg oral capsule: 1 cap(s) orally once a day (at bedtime) (10 Oct 2023 08:10)  aspirin 81 mg oral tablet: 1 tab(s) orally once a day (at bedtime) continue  (10 Oct 2023 08:10)  cycloSPORINE 0.05% ophthalmic emulsion: 1 in each affected eye 2 times a day (10 Oct 2023 08:10)  Dexilant 60 mg oral delayed release capsule: 1 cap(s) orally once a day (10 Oct 2023 08:10)  DilTIAZem (Eqv-Cardizem CD) 180 mg/24 hours oral capsule, extended release: 1 orally once a day (10 Oct 2023 08:10)  ezetimibe 10 mg oral tablet: 1 tab(s) orally once a day (at bedtime) (10 Oct 2023 08:10)  ezetimibe-simvastatin 10 mg-40 mg oral tablet: 1 tab(s) orally once a day (10 Oct 2023 08:10)  famotidine 20 mg oral tablet: 1 tab(s) orally (10 Oct 2023 08:10)  lansoprazole 30 mg oral delayed release capsule: 1 cap(s) orally (10 Oct 2023 08:07)  levothyroxine 75 mcg (0.075 mg) oral capsule: 1 cap(s) orally once a day (10 Oct 2023 08:10)  Lexapro 20 mg oral tablet: 1 tab(s) orally once a day (at bedtime) (10 Oct 2023 08:10)  lubiprostone 24 mcg oral capsule: 1 cap(s) orally (10 Oct 2023 08:10)  Maxalt 10 mg oral tablet: 1 tab(s) orally once a day (10 Oct 2023 08:10)  metFORMIN 500 mg oral tablet: 1 tab(s) orally 2 times a day ( hold the morning of procedure )  (10 Oct 2023 08:10)  naratriptan 2.5 mg oral tablet: 1 tab(s) orally once a day, As Needed for Migraine HA (10 Oct 2023 08:10)  Plavix 75 mg oral tablet: 1 tab(s) orally once a day (at bedtime) continue  (10 Oct 2023 08:10)  PreserVision oral capsule: 1 cap(s) orally once a day (10 Oct 2023 08:10)  Restasis 0.05% ophthalmic emulsion: 1 drop(s) to each affected eye every 12 hours (10 Oct 2023 08:10)  rizatriptan 10 mg oral tablet: 2 tab(s) orally (10 Oct 2023 08:10)  Simbrinza 1%- 0.2% ophthalmic suspension: 1 drop(s) to each affected eye 2 times a day (10 Oct 2023 08:10)  simvastatin 20 mg oral tablet: 1 tab(s) orally once a day (at bedtime) (10 Oct 2023 08:10)  triamterene: 1  orally once a day (10 Oct 2023 08:10)  triamterene-hydrochlorothiazide 37.5 mg-25 mg oral tablet: 1 tab(s) orally once a day (10 Oct 2023 08:10)  Vitron-C: 1  orally once a day (10 Oct 2023 08:10)    Smoking: [ ] yes  [x] no    AICD/PPM: [ ] yes   [x] no    Physical Examination:  Daily Height in cm: 162.56 (10 Oct 2023 08:10)    Daily   Vital Signs Last 24 Hrs  T(C): 36.1 (10 Oct 2023 08:10), Max: 36.1 (10 Oct 2023 08:10)  T(F): 97 (10 Oct 2023 08:10), Max: 97 (10 Oct 2023 08:10)  HR: 55 (10 Oct 2023 08:10) (55 - 55)  BP: 144/66 (10 Oct 2023 08:10) (144/66 - 144/66)  BP(mean): --  RR: 18 (10 Oct 2023 08:10) (18 - 18)  SpO2: 98% (10 Oct 2023 08:10) (98% - 98%)    Parameters below as of 10 Oct 2023 08:10  Patient On (Oxygen Delivery Method): room air    Constitutional: NAD    HEENT: PERRLA, EOMI,       Neck:  No JVD    Respiratory: CTAB/L    Cardiovascular: S1 and S2    Gastrointestinal: BS+, soft, NT/ND    Extremities: No peripheral edema    Neurological: A/O x 3, no focal deficits    Psychiatric: Normal mood, normal affect    : No Herrera    Skin: No rashes    Comments:    ASA Class: I [ ]  II [x]  III [ ]  IV [ ]

## 2023-12-05 NOTE — ASU PREOP CHECKLIST - AS BP NONINV SITE
Annette Tobias MD  [address] Azael Hammond, [address] [facility]  Ph No:  [phone]  Fax:  [phone]:  Chief Complaint   Patient presents with    Follow-up     Pt in for 7 week follow up. HISTORY OF PRESENT ILLNESS:  Ms. Crystal Malik is a 61 y.o. female. Pt presents for 7 week follow up after starting new medication Trulicity for diabetes and weight loss potential.    Diabetes Type 2:  Controlled:    Pt's hgba1c 11/28/23 is 7.2%. 7% or less is controlled diabetes. Pt has been placed on Trulicity to help with both diabetes (glucose) management and to stimulate less eating for weight loss. Pt says Trulicity is helping, and feels it is a portion control. Adult ADHD:    Adderall 20mg is refilled,  Pt is taking two a day, one in am and 1/2 in pm.  Pt reports she is pleased with results of current dose, no change in medication is given today. Continue current therapy. Lower back pain:    Pt is considering using CBD oil for back pain. Pt is advised to be sure the obtains product from a known store that meets all state and federal guidelines to prevent failing on a urine drug test for work. Pt is advised that she needs to declare this before taking any urine drug test.    Elevated hgb 15.5    Pt is advised for a female her hgb is elevated and normal would be 12-14. Pt is advised that giving blood on an occasional or regular basis will help keep her hgb is a safer level. Pt is no longer having regular menstruation. Pt will RTC in 3 months, recheck, med refills, labs indicated cmp, hgba1c, cbc        HISTORY:  Allergies   Allergen Reactions    Latex Itching     Also causes burning and redness per patient.     Iodine Shortness Of Breath and Swelling    Penicillins Anaphylaxis    Alprazolam Other (See Comments)    Diazepam Other (See Comments)    Diphenhydramine-Acetaminophen Other (See Comments)    Egg White (Egg Protein) Other (See Comments)    Fluoxetine left upper arm

## 2023-12-06 ENCOUNTER — APPOINTMENT (OUTPATIENT)
Dept: NEUROSURGERY | Facility: CLINIC | Age: 83
End: 2023-12-06
Payer: MEDICARE

## 2023-12-06 VITALS
SYSTOLIC BLOOD PRESSURE: 134 MMHG | HEART RATE: 64 BPM | BODY MASS INDEX: 31.58 KG/M2 | HEIGHT: 64 IN | OXYGEN SATURATION: 96 % | DIASTOLIC BLOOD PRESSURE: 79 MMHG | WEIGHT: 185 LBS

## 2023-12-06 DIAGNOSIS — M54.9 DORSALGIA, UNSPECIFIED: ICD-10-CM

## 2023-12-06 DIAGNOSIS — G89.29 DORSALGIA, UNSPECIFIED: ICD-10-CM

## 2023-12-06 PROCEDURE — 99204 OFFICE O/P NEW MOD 45 MIN: CPT

## 2023-12-11 NOTE — H&P PST ADULT - VISION (WITH CORRECTIVE LENSES IF THE PATIENT USUALLY WEARS THEM):
Contacted patient and reviewed medication prep for prior to upcoming imaging. Patient aware scripts were sent to [facility]. Normal vision: sees adequately in most situations; can see medication labels, newsprint

## 2023-12-19 NOTE — H&P PST ADULT - MUSCULOSKELETAL COMMENTS
[FreeTextEntry1] : Mariela is a 30-year-old female who lives at [place].  She is currently laid taking a break from school but plans to restart next semester.  She was last employed at [employer] about 4 months ago.  She has a history of depression and anxiety along with significant trauma currently enrolled in DBT. Upon interview today she reports chronic symptoms of depression and chronic thoughts of suicide however she denied any acute safety concerns.  She denied any thoughts or intent to harm herself.  She reported anxiety and panic symptoms as her chief complaint.  Risks and benefits of treatment were discussed.
knees, left leg worsening pain, will need left hip surgery in the near  future

## 2024-03-19 ENCOUNTER — TRANSCRIPTION ENCOUNTER (OUTPATIENT)
Age: 84
End: 2024-03-19

## 2024-03-19 ENCOUNTER — OUTPATIENT (OUTPATIENT)
Dept: OUTPATIENT SERVICES | Facility: HOSPITAL | Age: 84
LOS: 1 days | End: 2024-03-19
Payer: MEDICARE

## 2024-03-19 VITALS
DIASTOLIC BLOOD PRESSURE: 76 MMHG | OXYGEN SATURATION: 100 % | HEIGHT: 64 IN | SYSTOLIC BLOOD PRESSURE: 177 MMHG | WEIGHT: 186.07 LBS | HEART RATE: 55 BPM | TEMPERATURE: 98 F | RESPIRATION RATE: 17 BRPM

## 2024-03-19 VITALS
RESPIRATION RATE: 16 BRPM | SYSTOLIC BLOOD PRESSURE: 162 MMHG | OXYGEN SATURATION: 97 % | HEART RATE: 61 BPM | DIASTOLIC BLOOD PRESSURE: 67 MMHG

## 2024-03-19 DIAGNOSIS — Z98.1 ARTHRODESIS STATUS: Chronic | ICD-10-CM

## 2024-03-19 DIAGNOSIS — Z98.49 CATARACT EXTRACTION STATUS, UNSPECIFIED EYE: Chronic | ICD-10-CM

## 2024-03-19 DIAGNOSIS — R07.9 CHEST PAIN, UNSPECIFIED: ICD-10-CM

## 2024-03-19 DIAGNOSIS — Z96.641 PRESENCE OF RIGHT ARTIFICIAL HIP JOINT: Chronic | ICD-10-CM

## 2024-03-19 DIAGNOSIS — Z98.891 HISTORY OF UTERINE SCAR FROM PREVIOUS SURGERY: Chronic | ICD-10-CM

## 2024-03-19 DIAGNOSIS — Z96.642 PRESENCE OF LEFT ARTIFICIAL HIP JOINT: Chronic | ICD-10-CM

## 2024-03-19 DIAGNOSIS — Z96.659 PRESENCE OF UNSPECIFIED ARTIFICIAL KNEE JOINT: Chronic | ICD-10-CM

## 2024-03-19 LAB — GLUCOSE BLDC GLUCOMTR-MCNC: 110 MG/DL — HIGH (ref 70–99)

## 2024-03-19 PROCEDURE — 43236 UPPR GI SCOPE W/SUBMUC INJ: CPT

## 2024-03-19 PROCEDURE — 82962 GLUCOSE BLOOD TEST: CPT

## 2024-03-19 RX ORDER — LUBIPROSTONE 24 UG/1
1 CAPSULE, GELATIN COATED ORAL
Qty: 0 | Refills: 0 | DISCHARGE

## 2024-03-19 RX ORDER — METFORMIN HYDROCHLORIDE 850 MG/1
1 TABLET ORAL
Qty: 0 | Refills: 0 | DISCHARGE

## 2024-03-19 RX ORDER — NARATRIPTAN HYDROCHLORIDE 1 MG/1
1 TABLET, FILM COATED ORAL
Qty: 0 | Refills: 0 | DISCHARGE

## 2024-03-19 RX ORDER — LUBIPROSTONE 24 UG/1
1 CAPSULE, GELATIN COATED ORAL
Refills: 0 | DISCHARGE

## 2024-03-19 RX ORDER — FAMOTIDINE 10 MG/ML
1 INJECTION INTRAVENOUS
Refills: 0 | DISCHARGE

## 2024-03-19 RX ORDER — MULTIVIT-MIN/FERROUS GLUCONATE 9 MG/15 ML
1 LIQUID (ML) ORAL
Qty: 0 | Refills: 0 | DISCHARGE

## 2024-03-19 RX ORDER — CYCLOSPORINE 0.5 MG/ML
1 EMULSION OPHTHALMIC
Qty: 0 | Refills: 0 | DISCHARGE

## 2024-03-19 RX ORDER — CLOPIDOGREL BISULFATE 75 MG/1
1 TABLET, FILM COATED ORAL
Refills: 0 | DISCHARGE

## 2024-03-19 RX ORDER — EZETIMIBE 10 MG/1
1 TABLET ORAL
Qty: 0 | Refills: 0 | DISCHARGE

## 2024-03-19 RX ORDER — IRON,CARBONYL/ASCORBIC ACID 65MG-125MG
1 TABLET, DELAYED RELEASE (ENTERIC COATED) ORAL
Qty: 0 | Refills: 0 | DISCHARGE

## 2024-03-19 RX ORDER — BRINZOLAMIDE/BRIMONIDINE TARTRATE 10; 2 MG/ML; MG/ML
1 SUSPENSION/ DROPS OPHTHALMIC
Qty: 0 | Refills: 0 | DISCHARGE

## 2024-03-19 RX ORDER — CYCLOSPORINE 0.5 MG/ML
1 EMULSION OPHTHALMIC
Refills: 0 | DISCHARGE

## 2024-03-19 RX ORDER — ASPIRIN/CALCIUM CARB/MAGNESIUM 324 MG
1 TABLET ORAL
Qty: 0 | Refills: 0 | DISCHARGE

## 2024-03-19 RX ORDER — DILTIAZEM HCL 120 MG
1 CAPSULE, EXT RELEASE 24 HR ORAL
Refills: 0 | DISCHARGE

## 2024-03-19 RX ORDER — SODIUM CHLORIDE 9 MG/ML
500 INJECTION INTRAMUSCULAR; INTRAVENOUS; SUBCUTANEOUS
Refills: 0 | Status: DISCONTINUED | OUTPATIENT
Start: 2024-03-19 | End: 2024-04-02

## 2024-03-19 RX ORDER — EZETIMIBE AND SIMVASTATIN 10; 80 MG/1; MG/1
1 TABLET, FILM COATED ORAL
Qty: 0 | Refills: 0 | DISCHARGE

## 2024-03-19 RX ORDER — LANSOPRAZOLE 15 MG/1
1 CAPSULE, DELAYED RELEASE ORAL
Refills: 0 | DISCHARGE

## 2024-03-19 RX ORDER — RIZATRIPTAN BENZOATE 5 MG/1
2 TABLET ORAL
Refills: 0 | DISCHARGE

## 2024-03-19 RX ORDER — LEVOTHYROXINE SODIUM 125 MCG
1 TABLET ORAL
Qty: 0 | Refills: 0 | DISCHARGE

## 2024-03-19 RX ORDER — RIZATRIPTAN BENZOATE 5 MG/1
1 TABLET ORAL
Qty: 0 | Refills: 0 | DISCHARGE

## 2024-03-19 RX ORDER — DEXLANSOPRAZOLE 30 MG/1
1 CAPSULE, DELAYED RELEASE ORAL
Qty: 0 | Refills: 0 | DISCHARGE

## 2024-03-19 RX ORDER — CLOPIDOGREL BISULFATE 75 MG/1
1 TABLET, FILM COATED ORAL
Qty: 0 | Refills: 0 | DISCHARGE

## 2024-03-19 RX ORDER — SIMVASTATIN 20 MG/1
1 TABLET, FILM COATED ORAL
Qty: 0 | Refills: 0 | DISCHARGE

## 2024-03-19 RX ORDER — TRIAMTERENE 100 MG/1
1 CAPSULE ORAL
Qty: 0 | Refills: 0 | DISCHARGE

## 2024-03-19 RX ORDER — ESCITALOPRAM OXALATE 10 MG/1
1 TABLET, FILM COATED ORAL
Qty: 0 | Refills: 0 | DISCHARGE

## 2024-03-19 RX ORDER — TRIAMTERENE/HYDROCHLOROTHIAZID 75 MG-50MG
1 TABLET ORAL
Qty: 0 | Refills: 0 | DISCHARGE

## 2024-03-19 NOTE — ASU DISCHARGE PLAN (ADULT/PEDIATRIC) - NS MD DC FALL RISK RISK
For information on Fall & Injury Prevention, visit: [email or web address] OR  [email or web address] OR  https://www.cdc.gov/steadi/patient.html

## 2024-03-19 NOTE — ASU DISCHARGE PLAN (ADULT/PEDIATRIC) - PROVIDER TOKENS
You can access the FollowMyHealth Patient Portal offered by [facility] by registering at the following website: [email or web address]. By joining [facility]’s FollowMyHealth portal, you will also be able to view your health information using other applications (apps) compatible with our system. PROVIDER:[TOKEN:[[id number]],ESTABLISHEDPATIENT:[T]]

## 2024-03-19 NOTE — ASU PATIENT PROFILE, ADULT - FALL HARM RISK - UNIVERSAL INTERVENTIONS
Bed in lowest position, wheels locked, appropriate side rails in place/Call bell, personal items and telephone in reach/Instruct patient to call for assistance before getting out of bed or chair/Non-slip footwear when patient is out of bed/Arp to call system/Physically safe environment - no spills, clutter or unnecessary equipment/Purposeful Proactive Rounding/Room/bathroom lighting operational, light cord in reach

## 2024-03-19 NOTE — ASU DISCHARGE PLAN (ADULT/PEDIATRIC) - CARE PROVIDER_API CALL
Stewart Peacock Ramon  Gastroenterology  [address], [address]  [place] [address]  Phone: [phone]  Fax: [phone]  Established Patient  Follow Up Time:

## 2024-03-19 NOTE — PRE PROCEDURE NOTE - PRE PROCEDURE EVALUATION
Pre-Endoscopy Evaluation      Referring Physician:  Stewatr Peacock M.D.                          Procedure:  EGD Botox    Indication for Procedure:  Dysphagia    Pertinent History:      Sedation by Anesthesia [x]    PAST MEDICAL & SURGICAL HISTORY:  Dysphagia      Hyperlipidemia      Hypertension      Coronary artery disease      Presence of stent in artery  x2 2005 , 10/2018 x 1, 04/2019 x 1 on Plavix, Asa      Type 2 diabetes mellitus      Chronic constipation      Glaucoma      Migraine headache      Osteoarthritis      VITO on CPAP      GERD (gastroesophageal reflux disease)      History of gastric ulcer  duodenal ulcer       History of hepatitis A  1968      Anxiety      RSD (reflex sympathetic dystrophy)  Chronic pain, both knees L>R, back, not taking pain medications      Former smoker      Hard of hearing      Hypothyroidism due to Hashimoto's thyroiditis      S/P knee replacement  x 2  and 2010      S/P laminectomy with spinal fusion  2011      S/P hip replacement, right  2016      S/P   x2      H/O cataract extraction      S/P hip replacement, left          PMH of Gastroparesis [ ]  Gastric Surgery [ ]  Gastric Outlet Obstruction [ ]  None [x]    Allergies    adhesives (Rash)  penicillin (Rash)    Latex allergy: [ ] yes [x] no    Medications:MEDICATIONS  (STANDING):  sodium chloride 0.9%. 500 milliLiter(s) (30 mL/Hr) IV Continuous <Continuous>    MEDICATIONS  (PRN):    Home Medications:  Amitiza 24 mcg oral capsule: 1 cap(s) orally once a day (at bedtime) (19 Mar 2024 08:08)  aspirin 81 mg oral tablet: 1 tab(s) orally once a day (at bedtime) continue  (19 Mar 2024 08:08)  clopidogrel 75 mg oral tablet: 1 tab(s) orally (19 Mar 2024 08:08)  cycloSPORINE 0.05% ophthalmic emulsion: 1 in each affected eye 2 times a day (19 Mar 2024 08:08)  Dexilant 60 mg oral delayed release capsule: 1 cap(s) orally once a day (19 Mar 2024 08:08)  DilTIAZem (Eqv-Cardizem CD) 180 mg/24 hours oral capsule, extended release: 1 orally once a day (19 Mar 2024 08:08)  ezetimibe 10 mg oral tablet: 1 tab(s) orally once a day (at bedtime) (19 Mar 2024 08:08)  ezetimibe-simvastatin 10 mg-40 mg oral tablet: 1 tab(s) orally once a day (19 Mar 2024 08:08)  famotidine 20 mg oral tablet: 1 tab(s) orally (19 Mar 2024 08:08)  lansoprazole 30 mg oral delayed release capsule: 1 cap(s) orally (19 Mar 2024 08:08)  levothyroxine 75 mcg (0.075 mg) oral capsule: 1 cap(s) orally once a day (19 Mar 2024 08:08)  Lexapro 20 mg oral tablet: 1 tab(s) orally once a day (at bedtime) (19 Mar 2024 08:08)  lubiprostone 24 mcg oral capsule: 1 cap(s) orally (19 Mar 2024 08:08)  Maxalt 10 mg oral tablet: 1 tab(s) orally once a day (19 Mar 2024 08:08)  metFORMIN 500 mg oral tablet: 1 tab(s) orally 2 times a day ( hold the morning of procedure )  (19 Mar 2024 08:08)  naratriptan 2.5 mg oral tablet: 1 tab(s) orally once a day, As Needed for Migraine HA (19 Mar 2024 08:08)  Plavix 75 mg oral tablet: 1 tab(s) orally once a day (at bedtime) continue  (19 Mar 2024 08:08)  PreserVision oral capsule: 1 cap(s) orally once a day (19 Mar 2024 08:08)  Restasis 0.05% ophthalmic emulsion: 1 drop(s) to each affected eye every 12 hours (19 Mar 2024 08:08)  rizatriptan 10 mg oral tablet: 2 tab(s) orally (19 Mar 2024 08:08)  Simbrinza 1%- 0.2% ophthalmic suspension: 1 drop(s) to each affected eye 2 times a day (19 Mar 2024 08:08)  simvastatin 20 mg oral tablet: 1 tab(s) orally once a day (at bedtime) (19 Mar 2024 08:08)  triamterene: 1  orally once a day (19 Mar 2024 08:08)  triamterene-hydrochlorothiazide 37.5 mg-25 mg oral tablet: 1 tab(s) orally once a day (19 Mar 2024 08:08)  Vitron-C: 1  orally once a day (19 Mar 2024 08:08)      Smoking: [ ] yes  [x] no    AICD/PPM: [ ] yes   [x] no    Physical Examination:  Daily Height in cm: 162.56 (19 Mar 2024 08:09)    Daily   Vital Signs Last 24 Hrs  T(C): --  T(F): --  HR: --  BP: --  BP(mean): --  RR: --  SpO2: --        BP:                 HR:                  SPO2:               Temperature:    Constitutional: NAD    HEENT: PERRLA, EOMI,       Neck:  No JVD    Respiratory: CTAB/L    Cardiovascular: S1 and S2    Gastrointestinal: BS+, soft, NT/ND    Extremities: No peripheral edema    Neurological: A/O x 3, no focal deficits    Psychiatric: Normal mood, normal affect    : No Herrera    Skin: No rashes    Comments:    ASA Class: I [ ]  II [ ]  III [x]  IV [ ]                                                                                               Pre-Endoscopy Evaluation      Referring Physician:  Stewart Peacock M.D.                          Procedure:  EGD Botox    Indication for Procedure:  Dysphagia    Pertinent History:      Sedation by Anesthesia [x]    PAST MEDICAL & SURGICAL HISTORY:  Dysphagia      Hyperlipidemia      Hypertension      Coronary artery disease      Presence of stent in artery  x2 2005 , 10/2018 x 1, 04/2019 x 1 on Plavix, Asa      Type 2 diabetes mellitus      Chronic constipation      Glaucoma      Migraine headache      Osteoarthritis      VITO on CPAP      GERD (gastroesophageal reflux disease)      History of gastric ulcer  duodenal ulcer       History of hepatitis A  1968      Anxiety      RSD (reflex sympathetic dystrophy)  Chronic pain, both knees L>R, back, not taking pain medications      Former smoker      Hard of hearing      Hypothyroidism due to Hashimoto's thyroiditis      S/P knee replacement  x 2  and 2010      S/P laminectomy with spinal fusion  2011      S/P hip replacement, right  2016      S/P   x2      H/O cataract extraction      S/P hip replacement, left          PMH of Gastroparesis [ ]  Gastric Surgery [ ]  Gastric Outlet Obstruction [ ]  None [x]    Allergies    adhesives (Rash)  penicillin (Rash)    Latex allergy: [ ] yes [x] no    Medications:MEDICATIONS  (STANDING):  sodium chloride 0.9%. 500 milliLiter(s) (30 mL/Hr) IV Continuous <Continuous>    MEDICATIONS  (PRN):    Home Medications:  Amitiza 24 mcg oral capsule: 1 cap(s) orally once a day (at bedtime) (19 Mar 2024 08:08)  aspirin 81 mg oral tablet: 1 tab(s) orally once a day (at bedtime) continue  (19 Mar 2024 08:08)  clopidogrel 75 mg oral tablet: 1 tab(s) orally (19 Mar 2024 08:08)  cycloSPORINE 0.05% ophthalmic emulsion: 1 in each affected eye 2 times a day (19 Mar 2024 08:08)  Dexilant 60 mg oral delayed release capsule: 1 cap(s) orally once a day (19 Mar 2024 08:08)  DilTIAZem (Eqv-Cardizem CD) 180 mg/24 hours oral capsule, extended release: 1 orally once a day (19 Mar 2024 08:08)  ezetimibe 10 mg oral tablet: 1 tab(s) orally once a day (at bedtime) (19 Mar 2024 08:08)  ezetimibe-simvastatin 10 mg-40 mg oral tablet: 1 tab(s) orally once a day (19 Mar 2024 08:08)  famotidine 20 mg oral tablet: 1 tab(s) orally (19 Mar 2024 08:08)  lansoprazole 30 mg oral delayed release capsule: 1 cap(s) orally (19 Mar 2024 08:08)  levothyroxine 75 mcg (0.075 mg) oral capsule: 1 cap(s) orally once a day (19 Mar 2024 08:08)  Lexapro 20 mg oral tablet: 1 tab(s) orally once a day (at bedtime) (19 Mar 2024 08:08)  lubiprostone 24 mcg oral capsule: 1 cap(s) orally (19 Mar 2024 08:08)  Maxalt 10 mg oral tablet: 1 tab(s) orally once a day (19 Mar 2024 08:08)  metFORMIN 500 mg oral tablet: 1 tab(s) orally 2 times a day ( hold the morning of procedure )  (19 Mar 2024 08:08)  naratriptan 2.5 mg oral tablet: 1 tab(s) orally once a day, As Needed for Migraine HA (19 Mar 2024 08:08)  Plavix 75 mg oral tablet: 1 tab(s) orally once a day (at bedtime) continue  (19 Mar 2024 08:08)  PreserVision oral capsule: 1 cap(s) orally once a day (19 Mar 2024 08:08)  Restasis 0.05% ophthalmic emulsion: 1 drop(s) to each affected eye every 12 hours (19 Mar 2024 08:08)  rizatriptan 10 mg oral tablet: 2 tab(s) orally (19 Mar 2024 08:08)  Simbrinza 1%- 0.2% ophthalmic suspension: 1 drop(s) to each affected eye 2 times a day (19 Mar 2024 08:08)  simvastatin 20 mg oral tablet: 1 tab(s) orally once a day (at bedtime) (19 Mar 2024 08:08)  triamterene: 1  orally once a day (19 Mar 2024 08:08)  triamterene-hydrochlorothiazide 37.5 mg-25 mg oral tablet: 1 tab(s) orally once a day (19 Mar 2024 08:08)  Vitron-C: 1  orally once a day (19 Mar 2024 08:08)      Smoking: [ ] yes  [x] no    AICD/PPM: [ ] yes   [x] no    Physical Examination:  Daily Height in cm: 162.56 (19 Mar 2024 08:09)      BP:      120/72           HR:         72         SPO2:            99    Constitutional: NAD    HEENT: PERRLA, EOMI,       Neck:  No JVD    Respiratory: CTAB/L    Cardiovascular: S1 and S2    Gastrointestinal: BS+, soft, NT/ND    Extremities: No peripheral edema    Neurological: A/O x 3, no focal deficits    Psychiatric: Normal mood, normal affect    : No Herrera    Skin: No rashes    Comments:    ASA Class: I [ ]  II [ ]  III [x]  IV [ ]

## 2024-03-19 NOTE — PRE PROCEDURE NOTE - TIME BILLING
Stewart Peacock MD, FACP, FACG, AGAF  [facility]  [phone]     After hours and weekend coverage GI service : [phone]

## 2024-04-15 ENCOUNTER — APPOINTMENT (OUTPATIENT)
Dept: RHEUMATOLOGY | Facility: CLINIC | Age: 84
End: 2024-04-15
Payer: MEDICARE

## 2024-04-15 VITALS
HEART RATE: 77 BPM | DIASTOLIC BLOOD PRESSURE: 75 MMHG | OXYGEN SATURATION: 97 % | TEMPERATURE: 97.3 F | SYSTOLIC BLOOD PRESSURE: 147 MMHG | RESPIRATION RATE: 16 BRPM

## 2024-04-15 VITALS
DIASTOLIC BLOOD PRESSURE: 71 MMHG | RESPIRATION RATE: 16 BRPM | SYSTOLIC BLOOD PRESSURE: 136 MMHG | HEART RATE: 65 BPM | OXYGEN SATURATION: 97 %

## 2024-04-15 DIAGNOSIS — M81.0 AGE-RELATED OSTEOPOROSIS W/OUT CURRENT PATHOLOGICAL FRACTURE: ICD-10-CM

## 2024-04-15 PROCEDURE — 96374 THER/PROPH/DIAG INJ IV PUSH: CPT

## 2024-04-15 RX ORDER — ZOLEDRONIC ACID 5 MG/100ML
5 INJECTION INTRAVENOUS
Qty: 0 | Refills: 0 | Status: COMPLETED | OUTPATIENT
Start: 2024-04-15

## 2024-04-15 RX ADMIN — ZOLEDRONIC ACID 5 MG/100ML: 5 INJECTION, SOLUTION INTRAVENOUS at 00:00

## 2024-04-15 NOTE — HISTORY OF PRESENT ILLNESS
[N/A] : N/A [Denies] : Denies [Yes] : Yes [de-identified] : ambulates with cane  [Left upper extremity] : Left upper extremity [24g] : 24g [Start Time: ___] : Medication Start Time: [unfilled] [End Time: ___] : Medication End Time: [unfilled] [Medication Name: ___] : Medication Name: [unfilled] [Total Amount Administered: ___] : Total Amount Administered: [unfilled] [IV discontinued. Intact. No signs or symptoms of IV complications noted. Time: ___] : IV discontinued. Intact. No signs or symptoms of IV complications noted. Time: [unfilled] [Patient  instructed to seek medical attention with signs and symptoms of adverse effects] : Patient  instructed to seek medical attention with signs and symptoms of adverse effects [Patient left unit in no acute distress] : Patient left unit in no acute distress [Medications administered as ordered and tolerated well.] : Medications administered as ordered and tolerated well. [de-identified] : cephalic vein  [de-identified] : Patient presents for Zoledronic Acid infusion, doing well overall. Patient given discharge instructions, verbalized understanding and tolerated infusion well

## 2024-05-13 ENCOUNTER — APPOINTMENT (OUTPATIENT)
Dept: ORTHOPEDIC SURGERY | Facility: CLINIC | Age: 84
End: 2024-05-13
Payer: MEDICARE

## 2024-05-13 VITALS — WEIGHT: 185 LBS | BODY MASS INDEX: 31.58 KG/M2 | HEIGHT: 64 IN

## 2024-05-13 DIAGNOSIS — S63.502A UNSPECIFIED SPRAIN OF LEFT WRIST, INITIAL ENCOUNTER: ICD-10-CM

## 2024-05-13 PROCEDURE — 99202 OFFICE O/P NEW SF 15 MIN: CPT

## 2024-05-13 PROCEDURE — 73110 X-RAY EXAM OF WRIST: CPT | Mod: RT

## 2024-05-16 NOTE — HISTORY OF PRESENT ILLNESS
[10] : 10 [7] : 7 [Constant] : constant [Household chores] : household chores [Leisure] : leisure [Rest] : rest [Bending forward] : bending forward [Extending back] : extending back [Retired] : Work status: retired [] : Post Surgical Visit: no [FreeTextEntry1] : right wrist

## 2024-05-16 NOTE — REASON FOR VISIT
[FreeTextEntry2] : Patient complains of rt wrist pain for 2 weeks. Patient fell. Patient has previous x-rays from hospital. Wearing wrist brace.

## 2024-05-22 ENCOUNTER — APPOINTMENT (OUTPATIENT)
Dept: ORTHOPEDIC SURGERY | Facility: CLINIC | Age: 84
End: 2024-05-22

## 2024-05-22 ENCOUNTER — APPOINTMENT (OUTPATIENT)
Dept: ORTHOPEDIC SURGERY | Facility: CLINIC | Age: 84
End: 2024-05-22
Payer: MEDICARE

## 2024-05-22 VITALS — HEIGHT: 64 IN | WEIGHT: 185 LBS | BODY MASS INDEX: 31.58 KG/M2

## 2024-05-22 DIAGNOSIS — M19.031 PRIMARY OSTEOARTHRITIS, RIGHT WRIST: ICD-10-CM

## 2024-05-22 PROCEDURE — 99204 OFFICE O/P NEW MOD 45 MIN: CPT

## 2024-05-22 NOTE — DISCUSSION/SUMMARY
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 Dr. Almanza- The documentation recorded by the scribe accurately reflects the service I personally performed and the decisions made by me.

## 2024-05-22 NOTE — HISTORY OF PRESENT ILLNESS
[de-identified] : 5/22/24: Ms. Beatriz Mccracken, 84 y/o female (RHD, retired) is a former pt of Dr Welch, that presents today for R wrist pain s/p fall sustained 4/30/24. Reports stepped off a curb with a cane and lost her footing, involving mutliple body parts States x-rays were  negative, and wrist hurts at all times. Presents with wrist brace.  (hard of hearing).

## 2024-05-22 NOTE — PHYSICAL EXAM
[Left] : left hand [] : no erythema [Ulna Styloid] : ulna styloid [TWNoteComboBox7] : dorsiflexion 20 degrees [TWNoteComboBox4] : volarflexion 20 degrees

## 2024-05-22 NOTE — IMAGING
[Right] : right wrist [There are no fractures, subluxations or dislocations. No significant abnormalities are seen] : There are no fractures, subluxations or dislocations. No significant abnormalities are seen [Degenerative change] : Degenerative change

## 2024-06-19 ENCOUNTER — APPOINTMENT (OUTPATIENT)
Dept: ORTHOPEDIC SURGERY | Facility: CLINIC | Age: 84
End: 2024-06-19

## 2024-06-28 ENCOUNTER — APPOINTMENT (OUTPATIENT)
Dept: ORTHOPEDIC SURGERY | Facility: CLINIC | Age: 84
End: 2024-06-28
Payer: MEDICARE

## 2024-06-28 VITALS — WEIGHT: 185 LBS | BODY MASS INDEX: 31.58 KG/M2 | HEIGHT: 64 IN

## 2024-06-28 DIAGNOSIS — M19.041 PRIMARY OSTEOARTHRITIS, RIGHT HAND: ICD-10-CM

## 2024-06-28 DIAGNOSIS — M77.8 OTHER ENTHESOPATHIES, NOT ELSEWHERE CLASSIFIED: ICD-10-CM

## 2024-06-28 DIAGNOSIS — M19.042 PRIMARY OSTEOARTHRITIS, LEFT HAND: ICD-10-CM

## 2024-06-28 DIAGNOSIS — G56.01 CARPAL TUNNEL SYNDROME, RIGHT UPPER LIMB: ICD-10-CM

## 2024-06-28 PROCEDURE — 73140 X-RAY EXAM OF FINGER(S): CPT | Mod: LT

## 2024-06-28 PROCEDURE — 99213 OFFICE O/P EST LOW 20 MIN: CPT

## 2024-08-13 ENCOUNTER — APPOINTMENT (OUTPATIENT)
Dept: ANESTHESIOLOGY | Facility: CLINIC | Age: 84
End: 2024-08-13

## 2024-12-03 ENCOUNTER — TRANSCRIPTION ENCOUNTER (OUTPATIENT)
Age: 84
End: 2024-12-03

## 2024-12-03 ENCOUNTER — OUTPATIENT (OUTPATIENT)
Dept: OUTPATIENT SERVICES | Facility: HOSPITAL | Age: 84
LOS: 1 days | End: 2024-12-03
Payer: MEDICARE

## 2024-12-03 VITALS
HEIGHT: 64 IN | OXYGEN SATURATION: 96 % | HEART RATE: 57 BPM | DIASTOLIC BLOOD PRESSURE: 60 MMHG | TEMPERATURE: 98 F | RESPIRATION RATE: 20 BRPM | WEIGHT: 179.9 LBS | SYSTOLIC BLOOD PRESSURE: 125 MMHG

## 2024-12-03 VITALS
DIASTOLIC BLOOD PRESSURE: 58 MMHG | RESPIRATION RATE: 20 BRPM | SYSTOLIC BLOOD PRESSURE: 136 MMHG | HEART RATE: 58 BPM | OXYGEN SATURATION: 98 %

## 2024-12-03 DIAGNOSIS — Z96.642 PRESENCE OF LEFT ARTIFICIAL HIP JOINT: Chronic | ICD-10-CM

## 2024-12-03 DIAGNOSIS — R13.10 DYSPHAGIA, UNSPECIFIED: ICD-10-CM

## 2024-12-03 DIAGNOSIS — Z98.49 CATARACT EXTRACTION STATUS, UNSPECIFIED EYE: Chronic | ICD-10-CM

## 2024-12-03 DIAGNOSIS — Z98.1 ARTHRODESIS STATUS: Chronic | ICD-10-CM

## 2024-12-03 DIAGNOSIS — Z98.891 HISTORY OF UTERINE SCAR FROM PREVIOUS SURGERY: Chronic | ICD-10-CM

## 2024-12-03 DIAGNOSIS — Z90.2 ACQUIRED ABSENCE OF LUNG [PART OF]: Chronic | ICD-10-CM

## 2024-12-03 DIAGNOSIS — Z96.659 PRESENCE OF UNSPECIFIED ARTIFICIAL KNEE JOINT: Chronic | ICD-10-CM

## 2024-12-03 DIAGNOSIS — Z96.641 PRESENCE OF RIGHT ARTIFICIAL HIP JOINT: Chronic | ICD-10-CM

## 2024-12-03 LAB — GLUCOSE BLDC GLUCOMTR-MCNC: 101 MG/DL — HIGH (ref 70–99)

## 2024-12-03 PROCEDURE — 82962 GLUCOSE BLOOD TEST: CPT

## 2024-12-03 PROCEDURE — 43236 UPPR GI SCOPE W/SUBMUC INJ: CPT

## 2024-12-03 RX ORDER — SODIUM CHLORIDE 9 MG/ML
500 INJECTION, SOLUTION INTRAMUSCULAR; INTRAVENOUS; SUBCUTANEOUS
Refills: 0 | Status: DISCONTINUED | OUTPATIENT
Start: 2024-12-03 | End: 2024-12-17

## 2024-12-03 NOTE — ASU DISCHARGE PLAN (ADULT/PEDIATRIC) - CARE PROVIDER_API CALL
Stewart Peacock Ramon  Gastroenterology  [address], [address]  [place] [address]  Phone: [phone]  Fax: [phone]  Established Patient  Follow Up Time:

## 2024-12-03 NOTE — ASU DISCHARGE PLAN (ADULT/PEDIATRIC) - FINANCIAL ASSISTANCE
[facility] provides services at a reduced cost to those who are determined to be eligible through [facility]’s financial assistance program. Information regarding [facility]’s financial assistance program can be found by going to [email or web address] or by calling [phone].

## 2024-12-03 NOTE — ASU PATIENT PROFILE, ADULT - NSICDXPASTSURGICALHX_GEN_ALL_CORE_FT
PAST SURGICAL HISTORY:  H/O cataract extraction     S/P  x2    S/P hip replacement, left     S/P hip replacement, right 2016    S/P knee replacement x 2  and     S/P laminectomy with spinal fusion     S/P partial lobectomy of lung

## 2024-12-03 NOTE — ASU PATIENT PROFILE, ADULT - FALL HARM RISK - HARM RISK INTERVENTIONS

## 2024-12-03 NOTE — PRE PROCEDURE NOTE - PRE PROCEDURE EVALUATION
Pre-Endoscopy Evaluation      Referring Physician:  Stewart Peacock M.D.                          Procedure:  EGD/Botox    Indication for Procedure:  Dysphagia    Sedation by Anesthesia [x]    PAST MEDICAL & SURGICAL HISTORY:  Dysphagia      Hyperlipidemia      Hypertension      Coronary artery disease      Presence of stent in artery  x2 2005 , 10/2018 x 1, 04/2019 x 1 on Plavix, Asa      Type 2 diabetes mellitus      Chronic constipation      Glaucoma      Migraine headache      Osteoarthritis      VITO on CPAP      GERD (gastroesophageal reflux disease)      History of gastric ulcer  duodenal ulcer       History of hepatitis A  1968      Anxiety      RSD (reflex sympathetic dystrophy)  Chronic pain, both knees L>R, back, not taking pain medications      Former smoker      Hard of hearing      Hypothyroidism due to Hashimoto's thyroiditis      S/P knee replacement  x 2  and 2010      S/P laminectomy with spinal fusion  2011      S/P hip replacement, right  2016      S/P   x2      H/O cataract extraction      S/P hip replacement, left          PMH of Gastroparesis [ ]  Gastric Surgery [ ]  Gastric Outlet Obstruction [ ]  None [x]    Allergies    adhesives (Rash)  penicillin (Rash)      Latex allergy: [ ] yes [x] no    Medications:MEDICATIONS  (STANDING):  sodium chloride 0.9%. 500 milliLiter(s) (30 mL/Hr) IV Continuous <Continuous>    MEDICATIONS  (PRN):    Home Medications:  Amitiza 24 mcg oral capsule: 1 cap(s) orally once a day (at bedtime) (19 Mar 2024 08:08)  aspirin 81 mg oral tablet: 1 tab(s) orally once a day (at bedtime) continue  (19 Mar 2024 08:08)  clopidogrel 75 mg oral tablet: 1 tab(s) orally (19 Mar 2024 08:08)  cycloSPORINE 0.05% ophthalmic emulsion: 1 in each affected eye 2 times a day (19 Mar 2024 08:08)  Dexilant 60 mg oral delayed release capsule: 1 cap(s) orally once a day (19 Mar 2024 08:08)  DilTIAZem (Eqv-Cardizem CD) 180 mg/24 hours oral capsule, extended release: 1 orally once a day (19 Mar 2024 08:08)  ezetimibe 10 mg oral tablet: 1 tab(s) orally once a day (at bedtime) (19 Mar 2024 08:08)  ezetimibe-simvastatin 10 mg-40 mg oral tablet: 1 tab(s) orally once a day (19 Mar 2024 08:08)  famotidine 20 mg oral tablet: 1 tab(s) orally (19 Mar 2024 08:08)  lansoprazole 30 mg oral delayed release capsule: 1 cap(s) orally (19 Mar 2024 08:08)  levothyroxine 75 mcg (0.075 mg) oral capsule: 1 cap(s) orally once a day (19 Mar 2024 08:08)  Lexapro 20 mg oral tablet: 1 tab(s) orally once a day (at bedtime) (19 Mar 2024 08:08)  lubiprostone 24 mcg oral capsule: 1 cap(s) orally (19 Mar 2024 08:08)  Maxalt 10 mg oral tablet: 1 tab(s) orally once a day (19 Mar 2024 08:08)  metFORMIN 500 mg oral tablet: 1 tab(s) orally 2 times a day ( hold the morning of procedure )  (19 Mar 2024 08:08)  naratriptan 2.5 mg oral tablet: 1 tab(s) orally once a day, As Needed for Migraine HA (19 Mar 2024 08:08)  Plavix 75 mg oral tablet: 1 tab(s) orally once a day (at bedtime) continue  (19 Mar 2024 08:08)  PreserVision oral capsule: 1 cap(s) orally once a day (19 Mar 2024 08:08)  Restasis 0.05% ophthalmic emulsion: 1 drop(s) to each affected eye every 12 hours (19 Mar 2024 08:08)  Simbrinza 1%- 0.2% ophthalmic suspension: 1 drop(s) to each affected eye 2 times a day (19 Mar 2024 08:08)  simvastatin 20 mg oral tablet: 1 tab(s) orally once a day (at bedtime) (19 Mar 2024 08:08)  triamterene: 1  orally once a day (19 Mar 2024 08:08)  triamterene-hydrochlorothiazide 37.5 mg-25 mg oral tablet: 1 tab(s) orally once a day (19 Mar 2024 08:08)  Vitron-C: 1  orally once a day (19 Mar 2024 08:08)    Smoking: [ ] yes  [x] no    AICD/PPM: [ ] yes   [x] no        BP:                 HR:                  SPO2:               Temperature:    Constitutional: NAD    HEENT: PERRLA, EOMI,       Neck:  No JVD    Respiratory: CTAB/L    Cardiovascular: S1 and S2    Gastrointestinal: BS+, soft, NT/ND    Extremities: No peripheral edema    Neurological: A/O x 3, no focal deficits    Psychiatric: Normal mood, normal affect    : No Herrera    Skin: No rashes    Comments:    ASA Class: I [ ]  II [ ]  III [x]  IV [ ]  The patient is a suitable candidate for the planned procedure                                                                                               Pre-Endoscopy Evaluation      Referring Physician:  Stewart Peacock M.D.                          Procedure:  EGD/Botox    Indication for Procedure:  Dysphagia    Sedation by Anesthesia [x]    PAST MEDICAL & SURGICAL HISTORY:  Dysphagia      Hyperlipidemia      Hypertension      Coronary artery disease      Presence of stent in artery  x2 2005 , 10/2018 x 1, 04/2019 x 1 on Plavix, Asa      Type 2 diabetes mellitus      Chronic constipation      Glaucoma      Migraine headache      Osteoarthritis      VITO on CPAP      GERD (gastroesophageal reflux disease)      History of gastric ulcer  duodenal ulcer       History of hepatitis A  1968      Anxiety      RSD (reflex sympathetic dystrophy)  Chronic pain, both knees L>R, back, not taking pain medications      Former smoker      Hard of hearing      Hypothyroidism due to Hashimoto's thyroiditis      S/P knee replacement  x 2  and 2010      S/P laminectomy with spinal fusion  2011      S/P hip replacement, right  2016      S/P   x2      H/O cataract extraction      S/P hip replacement, left          PMH of Gastroparesis [ ]  Gastric Surgery [ ]  Gastric Outlet Obstruction [ ]  None [x]    Allergies    adhesives (Rash)  penicillin (Rash)      Latex allergy: [ ] yes [x] no    Medications:MEDICATIONS  (STANDING):  sodium chloride 0.9%. 500 milliLiter(s) (30 mL/Hr) IV Continuous <Continuous>    MEDICATIONS  (PRN):    Home Medications:  Amitiza 24 mcg oral capsule: 1 cap(s) orally once a day (at bedtime) (19 Mar 2024 08:08)  aspirin 81 mg oral tablet: 1 tab(s) orally once a day (at bedtime) continue  (19 Mar 2024 08:08)  clopidogrel 75 mg oral tablet: 1 tab(s) orally (19 Mar 2024 08:08)  cycloSPORINE 0.05% ophthalmic emulsion: 1 in each affected eye 2 times a day (19 Mar 2024 08:08)  Dexilant 60 mg oral delayed release capsule: 1 cap(s) orally once a day (19 Mar 2024 08:08)  DilTIAZem (Eqv-Cardizem CD) 180 mg/24 hours oral capsule, extended release: 1 orally once a day (19 Mar 2024 08:08)  ezetimibe 10 mg oral tablet: 1 tab(s) orally once a day (at bedtime) (19 Mar 2024 08:08)  ezetimibe-simvastatin 10 mg-40 mg oral tablet: 1 tab(s) orally once a day (19 Mar 2024 08:08)  famotidine 20 mg oral tablet: 1 tab(s) orally (19 Mar 2024 08:08)  lansoprazole 30 mg oral delayed release capsule: 1 cap(s) orally (19 Mar 2024 08:08)  levothyroxine 75 mcg (0.075 mg) oral capsule: 1 cap(s) orally once a day (19 Mar 2024 08:08)  Lexapro 20 mg oral tablet: 1 tab(s) orally once a day (at bedtime) (19 Mar 2024 08:08)  lubiprostone 24 mcg oral capsule: 1 cap(s) orally (19 Mar 2024 08:08)  Maxalt 10 mg oral tablet: 1 tab(s) orally once a day (19 Mar 2024 08:08)  metFORMIN 500 mg oral tablet: 1 tab(s) orally 2 times a day ( hold the morning of procedure )  (19 Mar 2024 08:08)  naratriptan 2.5 mg oral tablet: 1 tab(s) orally once a day, As Needed for Migraine HA (19 Mar 2024 08:08)  Plavix 75 mg oral tablet: 1 tab(s) orally once a day (at bedtime) continue  (19 Mar 2024 08:08)  PreserVision oral capsule: 1 cap(s) orally once a day (19 Mar 2024 08:08)  Restasis 0.05% ophthalmic emulsion: 1 drop(s) to each affected eye every 12 hours (19 Mar 2024 08:08)  Simbrinza 1%- 0.2% ophthalmic suspension: 1 drop(s) to each affected eye 2 times a day (19 Mar 2024 08:08)  simvastatin 20 mg oral tablet: 1 tab(s) orally once a day (at bedtime) (19 Mar 2024 08:08)  triamterene: 1  orally once a day (19 Mar 2024 08:08)  triamterene-hydrochlorothiazide 37.5 mg-25 mg oral tablet: 1 tab(s) orally once a day (19 Mar 2024 08:08)  Vitron-C: 1  orally once a day (19 Mar 2024 08:08)    Smoking: [ ] yes  [x] no    AICD/PPM: [ ] yes   [x] no        BP: 130/75    HR  72     SPO2:  98       Temperature:   98      Constitutional: NAD    HEENT: PERRLA, EOMI,       Neck:  No JVD    Respiratory: CTAB/L    Cardiovascular: S1 and S2    Gastrointestinal: BS+, soft, NT/ND    Extremities: No peripheral edema    Neurological: A/O x 3, no focal deficits    Psychiatric: Normal mood, normal affect    : No Herrera    Skin: No rashes    Comments:    ASA Class: I [ ]  II [ ]  III [x]  IV [ ]  The patient is a suitable candidate for the planned procedure

## 2024-12-03 NOTE — PRE PROCEDURE NOTE - TIME BILLING
Stewart Peacock MD, FACP, FACG, AGAF  [facility]  [phone]     After hours and weekend coverage GI service : [phone]

## 2024-12-03 NOTE — ASU DISCHARGE PLAN (ADULT/PEDIATRIC) - NSDISCH_COLONOSCOPY_ENDO_ALL_CORE_FT
Patient resting quietly awaiting CTA results conversing with her  at bedside reporting she feels much better     Kendell Gilliland RN  06/25/18 0512 If a colonoscopy was performed you might notice a few drops of blood on your underwear or you might see blood on the toilet paper after you use the bathroom. This is caused by irritation to the bowel during the procedure and is not a problem. However if you have any more heavy bleeding (more than 2 tablespoons of bright red blood) contact your doctor right away.

## 2025-02-21 DIAGNOSIS — M81.0 AGE-RELATED OSTEOPOROSIS W/OUT CURRENT PATHOLOGICAL FRACTURE: ICD-10-CM

## 2025-04-16 ENCOUNTER — APPOINTMENT (OUTPATIENT)
Dept: RHEUMATOLOGY | Facility: CLINIC | Age: 85
End: 2025-04-16
Payer: MEDICARE

## 2025-04-16 VITALS
SYSTOLIC BLOOD PRESSURE: 165 MMHG | RESPIRATION RATE: 16 BRPM | DIASTOLIC BLOOD PRESSURE: 78 MMHG | TEMPERATURE: 97.6 F | OXYGEN SATURATION: 98 % | HEART RATE: 61 BPM

## 2025-04-16 VITALS — HEART RATE: 58 BPM | DIASTOLIC BLOOD PRESSURE: 77 MMHG | OXYGEN SATURATION: 97 % | SYSTOLIC BLOOD PRESSURE: 159 MMHG

## 2025-04-16 PROCEDURE — 96374 THER/PROPH/DIAG INJ IV PUSH: CPT

## 2025-09-17 ENCOUNTER — EMERGENCY (EMERGENCY)
Facility: HOSPITAL | Age: 85
LOS: 1 days | End: 2025-09-17
Attending: EMERGENCY MEDICINE | Admitting: EMERGENCY MEDICINE
Payer: MEDICARE

## 2025-09-17 VITALS
OXYGEN SATURATION: 98 % | TEMPERATURE: 99 F | HEIGHT: 64 IN | RESPIRATION RATE: 16 BRPM | SYSTOLIC BLOOD PRESSURE: 163 MMHG | WEIGHT: 184.97 LBS | DIASTOLIC BLOOD PRESSURE: 70 MMHG | HEART RATE: 62 BPM

## 2025-09-17 DIAGNOSIS — Z98.1 ARTHRODESIS STATUS: Chronic | ICD-10-CM

## 2025-09-17 DIAGNOSIS — Z90.2 ACQUIRED ABSENCE OF LUNG [PART OF]: Chronic | ICD-10-CM

## 2025-09-17 DIAGNOSIS — Z96.642 PRESENCE OF LEFT ARTIFICIAL HIP JOINT: Chronic | ICD-10-CM

## 2025-09-17 DIAGNOSIS — Z98.891 HISTORY OF UTERINE SCAR FROM PREVIOUS SURGERY: Chronic | ICD-10-CM

## 2025-09-17 DIAGNOSIS — Z98.49 CATARACT EXTRACTION STATUS, UNSPECIFIED EYE: Chronic | ICD-10-CM

## 2025-09-17 DIAGNOSIS — Z96.659 PRESENCE OF UNSPECIFIED ARTIFICIAL KNEE JOINT: Chronic | ICD-10-CM

## 2025-09-17 DIAGNOSIS — Z96.641 PRESENCE OF RIGHT ARTIFICIAL HIP JOINT: Chronic | ICD-10-CM

## 2025-09-17 PROCEDURE — 99284 EMERGENCY DEPT VISIT MOD MDM: CPT | Mod: 25

## 2025-09-17 PROCEDURE — 99284 EMERGENCY DEPT VISIT MOD MDM: CPT | Mod: FS

## 2025-09-17 PROCEDURE — 96374 THER/PROPH/DIAG INJ IV PUSH: CPT

## 2025-09-17 PROCEDURE — 96375 TX/PRO/DX INJ NEW DRUG ADDON: CPT

## 2025-09-17 PROCEDURE — 93005 ELECTROCARDIOGRAM TRACING: CPT

## 2025-09-17 RX ORDER — PREDNISONE 20 MG/1
1 TABLET ORAL
Qty: 5 | Refills: 0
Start: 2025-09-17 | End: 2025-09-21

## 2025-09-17 RX ORDER — DIPHENHYDRAMINE HCL 12.5MG/5ML
25 ELIXIR ORAL ONCE
Refills: 0 | Status: COMPLETED | OUTPATIENT
Start: 2025-09-17 | End: 2025-09-17

## 2025-09-17 RX ORDER — METHYLPREDNISOLONE ACETATE 80 MG/ML
125 INJECTION, SUSPENSION INTRA-ARTICULAR; INTRALESIONAL; INTRAMUSCULAR; SOFT TISSUE ONCE
Refills: 0 | Status: COMPLETED | OUTPATIENT
Start: 2025-09-17 | End: 2025-09-17

## 2025-09-17 RX ADMIN — Medication 20 MILLIGRAM(S): at 12:44

## 2025-09-17 RX ADMIN — METHYLPREDNISOLONE ACETATE 125 MILLIGRAM(S): 80 INJECTION, SUSPENSION INTRA-ARTICULAR; INTRALESIONAL; INTRAMUSCULAR; SOFT TISSUE at 12:43

## 2025-09-17 RX ADMIN — Medication 25 MILLIGRAM(S): at 12:44

## 2025-09-28 PROBLEM — C43.62 MELANOMA OF FOREARM, LEFT: Status: ACTIVE | Noted: 2025-09-28

## (undated) DEVICE — CATH IV SAFE BC 20G X 1.16" (PINK)

## (undated) DEVICE — SYR ALLIANCE II INFLATION 60ML

## (undated) DEVICE — BALLOON US ENDO

## (undated) DEVICE — CATH IV SAFE BC 22G X 1" (BLUE)

## (undated) DEVICE — SENSOR O2 FINGER ADULT

## (undated) DEVICE — IRRIGATOR BIO SHIELD

## (undated) DEVICE — TUBING SUCTION 20FT

## (undated) DEVICE — FORCEP RADIAL JAW 4 JUMBO 2.8MM 3.2MM 240CM ORANGE DISP

## (undated) DEVICE — FOLEY HOLDER STATLOCK 2 WAY ADULT

## (undated) DEVICE — TUBING IV SET GRAVITY 3Y 100" MACRO

## (undated) DEVICE — NDL INJ SCLERO INTERJECT 25G

## (undated) DEVICE — BITE BLOCK ADULT 20 X 27MM (GREEN)

## (undated) DEVICE — SOL INJ NS 0.9% 500ML 2 PORT

## (undated) DEVICE — PACK IV START WITH CHG

## (undated) DEVICE — BRUSH COLONOSCOPY CYTOLOGY

## (undated) DEVICE — TUBING SUCTION CONN 6FT STERILE

## (undated) DEVICE — SUCTION YANKAUER NO CONTROL VENT

## (undated) DEVICE — Device

## (undated) DEVICE — BIOPSY FORCEP RADIAL JAW 4 STANDARD WITH NEEDLE

## (undated) DEVICE — CLAMP BX HOT RAD JAW 3

## (undated) DEVICE — NDL INJ SCLERO INTERJECT 23G

## (undated) DEVICE — FORCEP MULTIBITE MULTIPLE SAMPLE 2.4MM 2.8MM 240CM ORANGE DISP

## (undated) DEVICE — SENSOR O2 FINGER ADULT 24/CA

## (undated) DEVICE — SYR LUER LOK 50CC